# Patient Record
Sex: MALE | Race: WHITE | NOT HISPANIC OR LATINO | Employment: FULL TIME | URBAN - METROPOLITAN AREA
[De-identification: names, ages, dates, MRNs, and addresses within clinical notes are randomized per-mention and may not be internally consistent; named-entity substitution may affect disease eponyms.]

---

## 2017-05-20 ENCOUNTER — HOSPITAL ENCOUNTER (EMERGENCY)
Facility: HOSPITAL | Age: 52
Discharge: HOME | End: 2017-05-20
Attending: EMERGENCY MEDICINE | Admitting: EMERGENCY MEDICINE
Payer: COMMERCIAL

## 2017-05-20 VITALS
DIASTOLIC BLOOD PRESSURE: 70 MMHG | OXYGEN SATURATION: 97 % | TEMPERATURE: 96 F | RESPIRATION RATE: 24 BRPM | HEART RATE: 80 BPM | WEIGHT: 310 LBS | SYSTOLIC BLOOD PRESSURE: 142 MMHG

## 2017-05-20 DIAGNOSIS — T14.90XA INHALATION INJURY: Primary | ICD-10-CM

## 2017-05-20 PROCEDURE — 99283 EMERGENCY DEPT VISIT LOW MDM: CPT

## 2017-05-20 PROCEDURE — 94760 N-INVAS EAR/PLS OXIMETRY 1: CPT

## 2018-04-25 ENCOUNTER — OFFICE VISIT (OUTPATIENT)
Dept: URGENT CARE | Facility: MEDICAL CENTER | Age: 53
End: 2018-04-25
Payer: COMMERCIAL

## 2018-04-25 VITALS
SYSTOLIC BLOOD PRESSURE: 116 MMHG | RESPIRATION RATE: 20 BRPM | WEIGHT: 185 LBS | HEART RATE: 72 BPM | OXYGEN SATURATION: 97 % | TEMPERATURE: 98.4 F | DIASTOLIC BLOOD PRESSURE: 64 MMHG

## 2018-04-25 DIAGNOSIS — J30.1 SEASONAL ALLERGIC RHINITIS DUE TO POLLEN: Primary | ICD-10-CM

## 2018-04-25 DIAGNOSIS — R07.89 CHEST TIGHTNESS: ICD-10-CM

## 2018-04-25 PROCEDURE — 99213 OFFICE O/P EST LOW 20 MIN: CPT | Performed by: PHYSICIAN ASSISTANT

## 2018-04-25 PROCEDURE — 93005 ELECTROCARDIOGRAM TRACING: CPT | Performed by: PHYSICIAN ASSISTANT

## 2018-04-25 RX ORDER — MONTELUKAST SODIUM 10 MG/1
10 TABLET ORAL
Qty: 30 TABLET | Refills: 2 | Status: SHIPPED | OUTPATIENT
Start: 2018-04-25

## 2018-04-25 NOTE — PROGRESS NOTES
[facility]        NAME: Manuel Patiño is a 47 y o  male  : 1965    MRN: [id number]  DATE: 2018  TIME: 6:27 PM    Assessment and Plan   Seasonal allergic rhinitis due to pollen [J30 1]  1  Seasonal allergic rhinitis due to pollen  montelukast (SINGULAIR) 10 mg tablet   2  Chest tightness  POCT ECG         Patient Instructions     EKG looks good in office  No changes when compared to EKG in   Given duration of intermittent chest pressure and no other symptoms, will discharge to home at this time  If you chest pressure worsens or does not get better with treatment of allergies, go to the Er  If you have any shortness of breath, dizziness, nausea, vomiting, vision changes, or left shoulder pain, go to the Er  Use montelukast 10 mg daily for your allergy symptoms  Follow up with a PCP for your ongoing medical concerns  Follow up with PCP in 3-5 days  Proceed to  ER if symptoms worsen  Chief Complaint     Chief Complaint   Patient presents with    Chest Pain         History of Present Illness         This is a 69-year-old male presenting for allergy symptoms times 2-3 days  He states his symptoms consist of cough, nasal congestion  He states he has a strong history of seasonal allergies, he ran out of his Singulair over the winter and is requesting a refill  While in the office he mentions that he is also having some intermittent left-sided chest pain for the past 3 weeks  He states that at rest he does not have pain, when he takes a deep breath his pain gets worse initially and then feels much better  States that massaging the muscle over the anterior chest wall resolves his pain  The pain is not sharp or pressure in nature, he states that he cannot quite describe it  He denies any shortness of breath, radiation of the pain, dizziness, vision changes, shoulder pain, jaw pain, abdominal pain, nausea, vomiting, diarrhea, history of cardiac or pulmonary problems    He states that he has had chest pain like this in the past, when it occurs he gets an EKG to reassure himself that everything is okay  He has had stress testing in the past which was normal     EKG looked good in office, we discussed that the only way to rule out acute MI is via blood work, given duration of symptoms we had decided that emergent ER referral is not necessary given duration and intermittent nature of symptoms, the patient will go to the ER if anything worsens or changes  Review of Systems   Review of Systems   Constitutional: Negative for chills, fatigue and fever  HENT: Positive for congestion, postnasal drip, rhinorrhea and sinus pressure  Negative for ear discharge, ear pain, hearing loss and sore throat  Respiratory: Positive for cough  Negative for chest tightness and shortness of breath  Cardiovascular: Positive for chest pain  Gastrointestinal: Negative for abdominal pain, diarrhea, nausea and vomiting  Musculoskeletal: Positive for myalgias  Skin: Negative for color change  Neurological: Negative for dizziness, seizures, syncope, weakness, light-headedness, numbness and headaches  Current Medications       Current Outpatient Prescriptions:     montelukast (SINGULAIR) 10 mg tablet, Take 1 tablet (10 mg total) by mouth daily at bedtime, Disp: 30 tablet, Rfl: 2    Current Allergies     Allergies as of 04/25/2018    (No Known Allergies)            The following portions of the patient's history were reviewed and updated as appropriate: allergies, current medications, past family history, past medical history, past social history, past surgical history and problem list      Past Medical History:   Diagnosis Date    Seasonal allergies        No past surgical history on file  No family history on file  Medications have been verified          Objective   /64   Pulse 72   Temp 98 4 °F (36 9 °C) (Temporal)   Resp 20   Wt 83 9 kg (185 lb)   SpO2 97% Physical Exam     Physical Exam   Constitutional: He appears well-developed and well-nourished  No distress  HENT:   Head: Normocephalic and atraumatic  Right Ear: Tympanic membrane, external ear and ear canal normal  No drainage or tenderness  Left Ear: Tympanic membrane, external ear and ear canal normal  No drainage or tenderness  Nose: Mucosal edema and rhinorrhea present  Right sinus exhibits no maxillary sinus tenderness and no frontal sinus tenderness  Left sinus exhibits no maxillary sinus tenderness and no frontal sinus tenderness  Mouth/Throat: Uvula is midline, oropharynx is clear and moist and mucous membranes are normal  No oropharyngeal exudate, posterior oropharyngeal edema or posterior oropharyngeal erythema  Eyes: Conjunctivae are normal  Pupils are equal, round, and reactive to light  Neck: Normal range of motion  Neck supple  Cardiovascular: Normal rate, regular rhythm, S1 normal, S2 normal, normal heart sounds, intact distal pulses and normal pulses  Exam reveals no gallop and no friction rub  No murmur heard  Pulmonary/Chest: Effort normal and breath sounds normal  No respiratory distress  He has no decreased breath sounds  He has no wheezes  He has no rhonchi  He has no rales  He exhibits no tenderness  Lymphadenopathy:     He has no cervical adenopathy  Neurological: He is alert  Skin: Skin is warm and dry  He is not diaphoretic  Nursing note and vitals reviewed

## 2018-04-25 NOTE — PATIENT INSTRUCTIONS
EKG looks good in office  No changes when compared to EKG in 2012  Given duration of intermittent chest pressure and no other symptoms, will discharge to home at this time  If you chest pressure worsens or does not get better with treatment of allergies, go to the Er  If you have any shortness of breath, dizziness, nausea, vomiting, vision changes, or left shoulder pain, go to the Er  Use montelukast 10 mg daily for your allergy symptoms  Follow up with a PCP for your ongoing medical concerns  Go to the ER for any distress

## 2018-04-25 NOTE — PROGRESS NOTES
Pt with 3 week hx of chest tightness, cloudy head,dry eyes pain relieved after deep breathing  Ran out of prescription allergy medicine

## 2018-04-26 LAB
ATRIAL RATE: 63 BPM
P AXIS: 36 DEGREES
PR INTERVAL: 168 MS
QRS AXIS: 16 DEGREES
QRSD INTERVAL: 84 MS
QT INTERVAL: 406 MS
QTC INTERVAL: 415 MS
T WAVE AXIS: 24 DEGREES
VENTRICULAR RATE: 63 BPM

## 2018-04-26 PROCEDURE — 93010 ELECTROCARDIOGRAM REPORT: CPT | Performed by: INTERNAL MEDICINE

## 2018-05-11 ENCOUNTER — OFFICE VISIT (OUTPATIENT)
Dept: URGENT CARE | Facility: MEDICAL CENTER | Age: 53
End: 2018-05-11
Payer: COMMERCIAL

## 2018-05-11 VITALS
OXYGEN SATURATION: 98 % | HEIGHT: 69 IN | TEMPERATURE: 97.8 F | RESPIRATION RATE: 18 BRPM | BODY MASS INDEX: 41.47 KG/M2 | HEART RATE: 96 BPM | SYSTOLIC BLOOD PRESSURE: 140 MMHG | DIASTOLIC BLOOD PRESSURE: 80 MMHG | WEIGHT: 280 LBS

## 2018-05-11 DIAGNOSIS — J01.01 ACUTE RECURRENT MAXILLARY SINUSITIS: Primary | ICD-10-CM

## 2018-05-11 PROCEDURE — 99213 OFFICE O/P EST LOW 20 MIN: CPT | Performed by: PHYSICIAN ASSISTANT

## 2018-05-11 RX ORDER — PROMETHAZINE HYDROCHLORIDE AND CODEINE PHOSPHATE 6.25; 1 MG/5ML; MG/5ML
5 SYRUP ORAL EVERY 4 HOURS PRN
Qty: 120 ML | Refills: 0 | Status: SHIPPED | OUTPATIENT
Start: 2018-05-11 | End: 2021-07-15

## 2018-05-11 RX ORDER — AMOXICILLIN AND CLAVULANATE POTASSIUM 875; 125 MG/1; MG/1
1 TABLET, FILM COATED ORAL EVERY 12 HOURS SCHEDULED
Qty: 14 TABLET | Refills: 0 | Status: SHIPPED | OUTPATIENT
Start: 2018-05-11 | End: 2018-05-18

## 2018-05-11 RX ORDER — ALBUTEROL SULFATE 90 UG/1
2 AEROSOL, METERED RESPIRATORY (INHALATION) EVERY 6 HOURS PRN
Qty: 1 INHALER | Refills: 0 | Status: SHIPPED | OUTPATIENT
Start: 2018-05-11

## 2018-05-11 NOTE — PATIENT INSTRUCTIONS
Take Augmentin twice daily for 7 days  Take with food and eat yogurt or a probiotic to decrease GI upset  Use cough syrup as needed  Use inhalers as directed  Follow up with your PCP for continued symptoms  Go to the ER for any distress

## 2018-05-11 NOTE — PROGRESS NOTES
[facility]        NAME: Lauren Joseph is a 47 y o  male  : 1965    MRN: [id number]  DATE: May 11, 2018  TIME: 4:43 PM    Assessment and Plan   Acute recurrent maxillary sinusitis [J01 01]  1  Acute recurrent maxillary sinusitis  promethazine-codeine (PHENERGAN WITH CODEINE) 6 25-10 mg/5 mL syrup    amoxicillin-clavulanate (AUGMENTIN) 875-125 mg per tablet    albuterol (PROVENTIL HFA,VENTOLIN HFA) 90 mcg/act inhaler    fluticasone-salmeterol (ADVAIR) 250-50 mcg/dose inhaler         Patient Instructions      Take Augmentin twice daily for 7 days  Take with food and eat yogurt or a probiotic to decrease GI upset  Use cough syrup as needed  Use inhalers as directed  Follow up with PCP in 3-5 days  Proceed to  ER if symptoms worsen  Chief Complaint     Chief Complaint   Patient presents with    URI     Pt has cough, congestion, sinus pressure, ear ache x 7days  Taking Claritin         History of Present Illness       This is a 47year old male PMH seasonal allergies presenting for worsening cough and congestion x 7 days  He states that over the last 7 days his congestion has worsened and he is now having facial pain and subjective fevers, shortness of breath with cough, green mucus  He denies sore throat, ear pain, abdominal pain, nausea, vomiting, diarrhea  He has been using his allergy medication but is out of his holly and albuterol  He has a history of recurrent sinus infections  Review of Systems   Review of Systems   Constitutional: Positive for chills, fatigue and fever  HENT: Positive for congestion, postnasal drip, rhinorrhea, sinus pain and sinus pressure  Negative for ear discharge, ear pain, facial swelling and sore throat  Respiratory: Positive for cough, chest tightness and shortness of breath  Negative for wheezing  Gastrointestinal: Negative for abdominal pain, diarrhea, nausea and vomiting  Musculoskeletal: Negative for myalgias  Skin: Negative for rash  Neurological: Positive for headaches  Negative for dizziness and light-headedness  Current Medications       Current Outpatient Prescriptions:     montelukast (SINGULAIR) 10 mg tablet, Take 1 tablet (10 mg total) by mouth daily at bedtime, Disp: 30 tablet, Rfl: 2    albuterol (PROVENTIL HFA,VENTOLIN HFA) 90 mcg/act inhaler, Inhale 2 puffs every 6 (six) hours as needed for wheezing or shortness of breath, Disp: 1 Inhaler, Rfl: 0    amoxicillin-clavulanate (AUGMENTIN) 875-125 mg per tablet, Take 1 tablet by mouth every 12 (twelve) hours for 7 days, Disp: 14 tablet, Rfl: 0    fluticasone-salmeterol (ADVAIR) 250-50 mcg/dose inhaler, Inhale 1 puff every 12 (twelve) hours, Disp: 1 Inhaler, Rfl: 0    promethazine-codeine (PHENERGAN WITH CODEINE) 6 25-10 mg/5 mL syrup, Take 5 mL by mouth every 4 (four) hours as needed for cough, Disp: 120 mL, Rfl: 0    Current Allergies     Allergies as of 05/11/2018    (No Known Allergies)            The following portions of the patient's history were reviewed and updated as appropriate: allergies, current medications, past family history, past medical history, past social history, past surgical history and problem list      Past Medical History:   Diagnosis Date    Seasonal allergies        No past surgical history on file  No family history on file  Medications have been verified  Objective   /80 (BP Location: Right arm, Patient Position: Sitting)   Pulse 96   Temp 97 8 °F (36 6 °C) (Temporal)   Resp 18   Ht 5' 9" (1 753 m)   Wt 127 kg (280 lb)   SpO2 98%   BMI 41 35 kg/m²        Physical Exam     Physical Exam   Constitutional: He appears well-developed and well-nourished  No distress  HENT:   Head: Normocephalic  Right Ear: Tympanic membrane, external ear and ear canal normal  No drainage or tenderness  Left Ear: Tympanic membrane, external ear and ear canal normal  No drainage or tenderness     Nose: Mucosal edema and rhinorrhea present  Right sinus exhibits maxillary sinus tenderness  Right sinus exhibits no frontal sinus tenderness  Left sinus exhibits maxillary sinus tenderness  Left sinus exhibits no frontal sinus tenderness  Mouth/Throat: Uvula is midline and mucous membranes are normal  Posterior oropharyngeal erythema present  No oropharyngeal exudate or posterior oropharyngeal edema  Eyes: Conjunctivae are normal  Pupils are equal, round, and reactive to light  Neck: Normal range of motion  Neck supple  Cardiovascular: Normal rate, regular rhythm, normal heart sounds and intact distal pulses  Pulmonary/Chest: Effort normal and breath sounds normal  No respiratory distress  He has no decreased breath sounds  He has no wheezes  He has no rhonchi  He has no rales  Lymphadenopathy:     He has no cervical adenopathy  Neurological: He is alert  Skin: Skin is warm and dry  He is not diaphoretic  Nursing note and vitals reviewed

## 2018-08-17 ENCOUNTER — OFFICE VISIT (OUTPATIENT)
Dept: URGENT CARE | Facility: MEDICAL CENTER | Age: 53
End: 2018-08-17
Payer: COMMERCIAL

## 2018-08-17 VITALS
HEART RATE: 80 BPM | TEMPERATURE: 98.1 F | RESPIRATION RATE: 20 BRPM | BODY MASS INDEX: 44.95 KG/M2 | HEIGHT: 70 IN | DIASTOLIC BLOOD PRESSURE: 88 MMHG | OXYGEN SATURATION: 96 % | SYSTOLIC BLOOD PRESSURE: 132 MMHG | WEIGHT: 314 LBS

## 2018-08-17 DIAGNOSIS — R09.81 SINUS CONGESTION: Primary | ICD-10-CM

## 2018-08-17 PROCEDURE — 99213 OFFICE O/P EST LOW 20 MIN: CPT | Performed by: PHYSICIAN ASSISTANT

## 2018-08-17 RX ORDER — FLUTICASONE PROPIONATE 50 MCG
1 SPRAY, SUSPENSION (ML) NASAL DAILY
Qty: 1 BOTTLE | Refills: 1 | Status: SHIPPED | OUTPATIENT
Start: 2018-08-17 | End: 2021-07-15

## 2018-08-17 RX ORDER — LORATADINE 10 MG/1
10 TABLET ORAL
COMMUNITY

## 2018-08-17 NOTE — PATIENT INSTRUCTIONS
Take flonase nasal spray 1-2 sprays in each nostril daily  Add an antihistamine such as claritin or zyrtec to your regimen  Continue all other medications  Follow up with allergist   Go to the ER for any distress

## 2018-08-17 NOTE — PROGRESS NOTES
[facility]        NAME: Maykel Mojica is a 47 y o  male  : 1965    MRN: [id number]  DATE: 2018  TIME: 6:26 PM    Assessment and Plan   Sinus congestion [R09 81]  1  Sinus congestion  fluticasone (FLONASE) 50 mcg/act nasal spray    Ambulatory referral to Allergy         Patient Instructions     Take flonase nasal spray 1-2 sprays in each nostril daily  Add an antihistamine such as claritin or zyrtec to your regimen  Continue all other medications  Follow up with allergist   Follow up with PCP in 3-5 days  Proceed to  ER if symptoms worsen  Chief Complaint     Chief Complaint   Patient presents with    Nasal Congestion     Sinus congestion x 2 months  History of Present Illness       This is a 47year old male presenting for sinus congestion x 2 months  He states that he has a history of allergies and can feel them coming on  No fevers, facial pain  He used to see an allergist but has not done so in 30 years  He is looking for ways to prevent the progression of his allergy symptoms  Review of Systems   Review of Systems   Constitutional: Negative for chills, fatigue and fever  HENT: Positive for congestion  Negative for sinus pain  Respiratory: Negative for cough  Gastrointestinal: Negative for nausea and vomiting           Current Medications       Current Outpatient Prescriptions:     albuterol (PROVENTIL HFA,VENTOLIN HFA) 90 mcg/act inhaler, Inhale 2 puffs every 6 (six) hours as needed for wheezing or shortness of breath (Patient taking differently: Inhale 2 puffs as needed for wheezing or shortness of breath  ), Disp: 1 Inhaler, Rfl: 0    fluticasone-salmeterol (ADVAIR) 250-50 mcg/dose inhaler, Inhale 1 puff every 12 (twelve) hours (Patient taking differently: Inhale 1 puff as needed  ), Disp: 1 Inhaler, Rfl: 0    montelukast (SINGULAIR) 10 mg tablet, Take 1 tablet (10 mg total) by mouth daily at bedtime, Disp: 30 tablet, Rfl: 2   promethazine-codeine (PHENERGAN WITH CODEINE) 6 25-10 mg/5 mL syrup, Take 5 mL by mouth every 4 (four) hours as needed for cough (Patient taking differently: Take 5 mL by mouth as needed for cough  ), Disp: 120 mL, Rfl: 0    fluticasone (FLONASE) 50 mcg/act nasal spray, 1 spray into each nostril daily, Disp: 1 Bottle, Rfl: 1    loratadine (CLARITIN) 10 mg tablet, Take 10 mg by mouth, Disp: , Rfl:     Current Allergies     Allergies as of 08/17/2018 - Reviewed 08/17/2018   Allergen Reaction Noted    Cat hair extract Sneezing 08/17/2018            The following portions of the patient's history were reviewed and updated as appropriate: allergies, current medications, past family history, past medical history, past social history, past surgical history and problem list      Past Medical History:   Diagnosis Date    Allergic     Seasonal allergies        History reviewed  No pertinent surgical history  Family History   Problem Relation Age of Onset    No Known Problems Mother          Medications have been verified  Objective   /88 Comment: 132/88  Pulse 80   Temp 98 1 °F (36 7 °C) (Temporal)   Resp 20   Ht 5' 10" (1 778 m)   Wt (!) 142 kg (314 lb)   SpO2 96%   BMI 45 05 kg/m²        Physical Exam     Physical Exam   Constitutional: He appears well-developed and well-nourished  No distress  HENT:   Head: Normocephalic and atraumatic  Nose: Nose normal    Eyes: Conjunctivae are normal  Pupils are equal, round, and reactive to light  Cardiovascular: Normal rate, regular rhythm and normal heart sounds  Pulmonary/Chest: Effort normal and breath sounds normal  No respiratory distress  He has no wheezes  He has no rales  Neurological: He is alert  Skin: Skin is warm and dry  He is not diaphoretic  Nursing note and vitals reviewed

## 2019-09-21 ENCOUNTER — OFFICE VISIT (OUTPATIENT)
Dept: URGENT CARE | Facility: MEDICAL CENTER | Age: 54
End: 2019-09-21
Payer: COMMERCIAL

## 2019-09-21 VITALS
TEMPERATURE: 98 F | OXYGEN SATURATION: 96 % | HEART RATE: 66 BPM | WEIGHT: 275 LBS | BODY MASS INDEX: 39.37 KG/M2 | HEIGHT: 70 IN | SYSTOLIC BLOOD PRESSURE: 119 MMHG | RESPIRATION RATE: 20 BRPM | DIASTOLIC BLOOD PRESSURE: 81 MMHG

## 2019-09-21 DIAGNOSIS — M70.50 PES ANSERINE BURSITIS: Primary | ICD-10-CM

## 2019-09-21 PROCEDURE — 99213 OFFICE O/P EST LOW 20 MIN: CPT | Performed by: PHYSICIAN ASSISTANT

## 2019-09-21 RX ORDER — PREDNISONE 50 MG/1
50 TABLET ORAL DAILY
Qty: 5 TABLET | Refills: 0 | Status: SHIPPED | OUTPATIENT
Start: 2019-09-21 | End: 2019-09-26

## 2019-09-21 NOTE — PROGRESS NOTES
[facility]        NAME: Nneka Alexandra is a 49 y o  male  : 1965    MRN: [id number]  DATE: 2019  TIME: 4:50 PM    Assessment and Plan   Pes anserine bursitis [M70 50]  1  Pes anserine bursitis  predniSONE 50 mg tablet         Patient Instructions     1  Take Prednisone 50mg  Daily x 5 days  2  ICE to area 10-15min 3-4x daily  3  Recommend follow-up with Orthopedics if no improvement in 3-5 days  Chief Complaint     Chief Complaint   Patient presents with    Knee Pain     Chronic         History of Present Illness       Michael Araiza is a 69-year-old male presents with pain on the medial aspect of his left knee that occurred over the past 2 days  Patient denies any fall or trauma, he reports the pain started after he twisted his leg getting out of a crane a few days prior  Patient reports no swelling or instability, he denies any collecting or locking sensation in his left knee  Review of Systems   Review of Systems   Constitutional: Negative  Musculoskeletal: Positive for gait problem  Negative for joint swelling           Current Medications       Current Outpatient Medications:     albuterol (PROVENTIL HFA,VENTOLIN HFA) 90 mcg/act inhaler, Inhale 2 puffs every 6 (six) hours as needed for wheezing or shortness of breath (Patient taking differently: Inhale 2 puffs as needed for wheezing or shortness of breath  ), Disp: 1 Inhaler, Rfl: 0    fluticasone (FLONASE) 50 mcg/act nasal spray, 1 spray into each nostril daily, Disp: 1 Bottle, Rfl: 1    fluticasone-salmeterol (ADVAIR) 250-50 mcg/dose inhaler, Inhale 1 puff every 12 (twelve) hours (Patient taking differently: Inhale 1 puff as needed  ), Disp: 1 Inhaler, Rfl: 0    loratadine (CLARITIN) 10 mg tablet, Take 10 mg by mouth, Disp: , Rfl:     montelukast (SINGULAIR) 10 mg tablet, Take 1 tablet (10 mg total) by mouth daily at bedtime, Disp: 30 tablet, Rfl: 2    predniSONE 50 mg tablet, Take 1 tablet (50 mg total) by mouth daily for 5 days, Disp: 5 tablet, Rfl: 0    promethazine-codeine (PHENERGAN WITH CODEINE) 6 25-10 mg/5 mL syrup, Take 5 mL by mouth every 4 (four) hours as needed for cough (Patient taking differently: Take 5 mL by mouth as needed for cough  ), Disp: 120 mL, Rfl: 0    Current Allergies     Allergies as of 09/21/2019 - Reviewed 09/21/2019   Allergen Reaction Noted    Cat hair extract Sneezing 08/17/2018            The following portions of the patient's history were reviewed and updated as appropriate: allergies, current medications, past family history, past medical history, past social history, past surgical history and problem list      Past Medical History:   Diagnosis Date    Allergic     Seasonal allergies        No past surgical history on file  Family History   Problem Relation Age of Onset    No Known Problems Mother          Medications have been verified  Objective   /81 (BP Location: Right arm, Patient Position: Sitting)   Pulse 66   Temp 98 °F (36 7 °C) (Temporal)   Resp 20   Ht 5' 10" (1 778 m)   Wt 125 kg (275 lb)   SpO2 96%   BMI 39 46 kg/m²        Physical Exam     Physical Exam   Constitutional: He appears well-developed and well-nourished  No distress  Cardiovascular: Normal rate, regular rhythm and normal heart sounds  No murmur heard    Pulmonary/Chest: Effort normal and breath sounds normal    Musculoskeletal:        Legs:

## 2019-09-21 NOTE — PATIENT INSTRUCTIONS
1  Take Prednisone 50mg  Daily x 5 days  2  ICE to area 10-15min 3-4x daily  3  Recommend follow-up with Orthopedics if no improvement in 3-5 days

## 2019-10-28 ENCOUNTER — APPOINTMENT (OUTPATIENT)
Dept: RADIOLOGY | Facility: AMBULARY SURGERY CENTER | Age: 54
End: 2019-10-28
Payer: COMMERCIAL

## 2019-10-28 VITALS
HEIGHT: 70 IN | HEART RATE: 68 BPM | WEIGHT: 303 LBS | BODY MASS INDEX: 43.38 KG/M2 | SYSTOLIC BLOOD PRESSURE: 143 MMHG | DIASTOLIC BLOOD PRESSURE: 79 MMHG

## 2019-10-28 DIAGNOSIS — M25.562 LEFT KNEE PAIN, UNSPECIFIED CHRONICITY: Primary | ICD-10-CM

## 2019-10-28 DIAGNOSIS — M25.562 LEFT KNEE PAIN, UNSPECIFIED CHRONICITY: ICD-10-CM

## 2019-10-28 PROCEDURE — 73562 X-RAY EXAM OF KNEE 3: CPT

## 2019-10-28 PROCEDURE — 20610 DRAIN/INJ JOINT/BURSA W/O US: CPT | Performed by: ORTHOPAEDIC SURGERY

## 2019-10-28 PROCEDURE — 99214 OFFICE O/P EST MOD 30 MIN: CPT | Performed by: ORTHOPAEDIC SURGERY

## 2019-10-28 RX ORDER — METHYLPREDNISOLONE 4 MG/1
TABLET ORAL
Refills: 0 | COMMUNITY
Start: 2019-08-07 | End: 2021-07-15

## 2019-10-28 RX ORDER — BETAMETHASONE SODIUM PHOSPHATE AND BETAMETHASONE ACETATE 3; 3 MG/ML; MG/ML
6 INJECTION, SUSPENSION INTRA-ARTICULAR; INTRALESIONAL; INTRAMUSCULAR; SOFT TISSUE
Status: COMPLETED | OUTPATIENT
Start: 2019-10-28 | End: 2019-10-28

## 2019-10-28 RX ORDER — ESCITALOPRAM OXALATE 10 MG/1
TABLET ORAL
Refills: 0 | COMMUNITY
Start: 2019-09-21 | End: 2021-07-15

## 2019-10-28 RX ORDER — LORAZEPAM 1 MG/1
1 TABLET ORAL
COMMUNITY
Start: 2019-01-03 | End: 2021-07-15

## 2019-10-28 RX ORDER — LIDOCAINE HYDROCHLORIDE 10 MG/ML
1 INJECTION, SOLUTION INFILTRATION; PERINEURAL
Status: COMPLETED | OUTPATIENT
Start: 2019-10-28 | End: 2019-10-28

## 2019-10-28 RX ORDER — MELOXICAM 7.5 MG/1
7.5 TABLET ORAL DAILY
Qty: 30 TABLET | Refills: 1 | Status: SHIPPED | OUTPATIENT
Start: 2019-10-28 | End: 2021-07-15

## 2019-10-28 RX ADMIN — BETAMETHASONE SODIUM PHOSPHATE AND BETAMETHASONE ACETATE 6 MG: 3; 3 INJECTION, SUSPENSION INTRA-ARTICULAR; INTRALESIONAL; INTRAMUSCULAR; SOFT TISSUE at 17:32

## 2019-10-28 RX ADMIN — LIDOCAINE HYDROCHLORIDE 1 ML: 10 INJECTION, SOLUTION INFILTRATION; PERINEURAL at 17:32

## 2019-10-28 NOTE — PROGRESS NOTES
Assessment:  1  Left knee pain, unspecified chronicity  XR knee 3 vw left non injury     Patient Active Problem List   Diagnosis    Left knee pain           Plan       patient was given a cortisone injection today  Patient had prednisone before patient also wearing brace  This brace has been utilized for over a week pain is not getting any better  Regarding trying get an MRI for him my suspicion is a medial meniscus tear  He will come back to see us after the MRI is done in the meantime will prescribe him some meloxicam to also help with his pain  Subjective:     Patient ID:    Chief Complaint:Agustin Renner 49 y o  male      HPI    Patient comes in today with regards to Left knee  The patient reports that the pain is in the  Medial aspect and has been going on for  1 month  Patient is a  was getting out of a crane pivoted on his knee had pain on the medial aspect of his left knee  He went to Urgent Care had a Medrol Dosepak prescribed that helped but then shortly wore off  He continues to have pain on the medial aspect the knee  The pain is rated at8 at its best and10 at its worst  Possible previous meniscus surgery on this knee  The pain is described as  problem  It is worsened with  uncertain, and is made better with  prednisone  The patient has taken  Ice bracing for treatment        The following portions of the patient's history were reviewed and updated as appropriate: allergies, current medications, past family history, past social history, past surgical history and problem list         Social History     Socioeconomic History    Marital status: /Civil Union     Spouse name: Not on file    Number of children: Not on file    Years of education: Not on file    Highest education level: Not on file   Occupational History    Not on file   Social Needs    Financial resource strain: Not on file    Food insecurity:     Worry: Not on file     Inability: Not on file  Transportation needs:     Medical: Not on file     Non-medical: Not on file   Tobacco Use    Smoking status: Never Smoker    Smokeless tobacco: Never Used   Substance and Sexual Activity    Alcohol use: No    Drug use: No    Sexual activity: Not on file   Lifestyle    Physical activity:     Days per week: Not on file     Minutes per session: Not on file    Stress: Not on file   Relationships    Social connections:     Talks on phone: Not on file     Gets together: Not on file     Attends [employer] service: Not on file     Active member of club or organization: Not on file     Attends meetings of clubs or organizations: Not on file     Relationship status: Not on file    Intimate partner violence:     Fear of current or ex partner: Not on file     Emotionally abused: Not on file     Physically abused: Not on file     Forced sexual activity: Not on file   Other Topics Concern    Not on file   Social History Narrative    Not on file     Past Medical History:   Diagnosis Date    Allergic     Seasonal allergies      History reviewed  No pertinent surgical history    Allergies   Allergen Reactions    Cat Hair Extract Sneezing     Current Outpatient Medications on File Prior to Visit   Medication Sig Dispense Refill    LORazepam (ATIVAN) 1 mg tablet Take 1 mg by mouth      albuterol (PROVENTIL HFA,VENTOLIN HFA) 90 mcg/act inhaler Inhale 2 puffs every 6 (six) hours as needed for wheezing or shortness of breath (Patient taking differently: Inhale 2 puffs as needed for wheezing or shortness of breath  ) 1 Inhaler 0    escitalopram (LEXAPRO) 10 mg tablet   0    fluticasone (FLONASE) 50 mcg/act nasal spray 1 spray into each nostril daily 1 Bottle 1    fluticasone-salmeterol (ADVAIR) 250-50 mcg/dose inhaler Inhale 1 puff every 12 (twelve) hours (Patient taking differently: Inhale 1 puff as needed  ) 1 Inhaler 0    loratadine (CLARITIN) 10 mg tablet Take 10 mg by mouth      methylPREDNISolone 4 MG tablet therapy pack as directed Taper dose per instructions inside package  0    montelukast (SINGULAIR) 10 mg tablet Take 1 tablet (10 mg total) by mouth daily at bedtime 30 tablet 2    promethazine-codeine (PHENERGAN WITH CODEINE) 6 25-10 mg/5 mL syrup Take 5 mL by mouth every 4 (four) hours as needed for cough (Patient taking differently: Take 5 mL by mouth as needed for cough  ) 120 mL 0     No current facility-administered medications on file prior to visit  Objective:    Review of Systems   Constitutional: Negative  HENT: Negative  Eyes: Negative  Respiratory: Negative  Cardiovascular: Negative  Gastrointestinal: Negative  Negative for vomiting  Genitourinary: Negative  Musculoskeletal:        Please refer to HPI   Skin: Negative  Neurological: Negative  Hematological: Negative  Psychiatric/Behavioral: Negative  All other systems reviewed and are negative  Right Knee Exam     Muscle Strength   The patient has normal right knee strength  Range of Motion   The patient has normal right knee ROM  Left Knee Exam     Muscle Strength   The patient has normal left knee strength  Tenderness   The patient is experiencing tenderness in the medial joint line  Range of Motion   The patient has normal left knee ROM  Tests   Broderick:  Medial - negative Lateral - negative  Varus: negative Valgus: negative    Other   Erythema: absent  Sensation: normal  Pulse: present  Swelling: none  Effusion: no effusion present    Comments:   Pinpoint tenderness medially negative provocative maneuvers for patella  Broderick's test bounce test were negative  Thessaly test was positive            Physical Exam   Constitutional: He is oriented to person, place, and time  He appears well-developed  HENT:   Head: Normocephalic  Eyes: Pupils are equal, round, and reactive to light  Neck: Neck supple  Cardiovascular: Intact distal pulses     Pulmonary/Chest: Effort normal  Abdominal: He exhibits no distension  Musculoskeletal:        Left knee: He exhibits no effusion  Neurological: He is alert and oriented to person, place, and time  Skin: Skin is warm  Psychiatric: He has a normal mood and affect  Nursing note and vitals reviewed  Large joint arthrocentesis: L knee  Date/Time: 10/28/2019 5:32 PM  Consent given by: patient  Timeout: Immediately prior to procedure a time out was called to verify the correct patient, procedure, equipment, support staff and site/side marked as required   Supporting Documentation  Indications: pain   Procedure Details  Location: knee - L knee  Needle size: 22 G  Ultrasound guidance: no  Approach: anterolateral  Medications administered: 1 mL lidocaine 1 %; 6 mg betamethasone acetate-betamethasone sodium phosphate 6 (3-3) mg/mL               I have personally reviewed pertinent films in PACS and my interpretation is Medial joint space narrowing subchondral sclerosis minimal osteophyte formation  Portions of the record may have been created with voice recognition software   Occasional wrong word or "sound a like" substitutions may have occurred due to the inherent limitations of voice recognition software   Read the chart carefully and recognize, using context, where substitutions have occurred

## 2019-11-13 ENCOUNTER — TELEPHONE (OUTPATIENT)
Dept: OBGYN CLINIC | Facility: HOSPITAL | Age: 54
End: 2019-11-13

## 2019-11-13 ENCOUNTER — HOSPITAL ENCOUNTER (OUTPATIENT)
Dept: RADIOLOGY | Age: 54
Discharge: HOME | End: 2019-11-13
Attending: ORTHOPAEDIC SURGERY
Payer: COMMERCIAL

## 2019-11-13 DIAGNOSIS — M25.562 LEFT KNEE PAIN, UNSPECIFIED CHRONICITY: ICD-10-CM

## 2019-11-13 PROCEDURE — 73721 MRI JNT OF LWR EXTRE W/O DYE: CPT

## 2019-11-13 NOTE — TELEPHONE ENCOUNTER
[facility]  [phone]    Dr Smith Homes     Patient states he was told to call for f/u mri review appt after 3pm tomorrow  Dr is done at 3pm  Please return his call  Thanks

## 2019-11-14 NOTE — TELEPHONE ENCOUNTER
Called patient to schedule, he stated that he will go home and talk to his wife and call us back to set up apt to see Pierce Indore  Schedule patient at his convenience

## 2019-12-16 ENCOUNTER — TELEPHONE (OUTPATIENT)
Dept: OBGYN CLINIC | Facility: HOSPITAL | Age: 54
End: 2019-12-16

## 2019-12-16 NOTE — TELEPHONE ENCOUNTER
Spoke with pt he understands there is no available sooner appointments told pt to call again soon just in case a pt cancels

## 2019-12-19 ENCOUNTER — OFFICE VISIT (OUTPATIENT)
Dept: UROLOGY | Facility: CLINIC | Age: 54
End: 2019-12-19
Payer: COMMERCIAL

## 2019-12-19 VITALS
BODY MASS INDEX: 41.66 KG/M2 | WEIGHT: 291 LBS | DIASTOLIC BLOOD PRESSURE: 68 MMHG | SYSTOLIC BLOOD PRESSURE: 144 MMHG | HEIGHT: 70 IN | HEART RATE: 92 BPM

## 2019-12-19 DIAGNOSIS — N40.0 BENIGN PROSTATIC HYPERPLASIA WITHOUT LOWER URINARY TRACT SYMPTOMS: Primary | ICD-10-CM

## 2019-12-19 PROCEDURE — 99213 OFFICE O/P EST LOW 20 MIN: CPT | Performed by: PHYSICIAN ASSISTANT

## 2019-12-19 NOTE — PROGRESS NOTES
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  He has not had a prostate exam in the past   He gets up 2-3 times per night and drinks iced tea and coffee  Hemoglobin A1c is 5 7  Blood glucose 111  Past Medical, Past Surgical History:     Past Medical History:   Diagnosis Date    Allergic     Seasonal allergies    :    History reviewed   No pertinent surgical history :    Medications, Allergies:     Current Outpatient Medications:     albuterol (PROVENTIL HFA,VENTOLIN HFA) 90 mcg/act inhaler, Inhale 2 puffs every 6 (six) hours as needed for wheezing or shortness of breath (Patient taking differently: Inhale 2 puffs as needed for wheezing or shortness of breath  ), Disp: 1 Inhaler, Rfl: 0    escitalopram (LEXAPRO) 10 mg tablet, , Disp: , Rfl: 0    fluticasone (FLONASE) 50 mcg/act nasal spray, 1 spray into each nostril daily, Disp: 1 Bottle, Rfl: 1    fluticasone-salmeterol (ADVAIR) 250-50 mcg/dose inhaler, Inhale 1 puff every 12 (twelve) hours (Patient taking differently: Inhale 1 puff as needed  ), Disp: 1 Inhaler, Rfl: 0    loratadine (CLARITIN) 10 mg tablet, Take 10 mg by mouth, Disp: , Rfl:     LORazepam (ATIVAN) 1 mg tablet, Take 1 mg by mouth, Disp: , Rfl:     meloxicam (MOBIC) 7 5 mg tablet, Take 1 tablet (7 5 mg total) by mouth daily, Disp: 30 tablet, Rfl: 1    methylPREDNISolone 4 MG tablet therapy pack, as directed Taper dose per instructions inside package, Disp: , Rfl: 0    montelukast (SINGULAIR) 10 mg tablet, Take 1 tablet (10 mg total) by mouth daily at bedtime, Disp: 30 tablet, Rfl: 2    promethazine-codeine (PHENERGAN WITH CODEINE) 6 25-10 mg/5 mL syrup, Take 5 mL by mouth every 4 (four) hours as needed for cough (Patient taking differently: Take 5 mL by mouth as needed for cough  ), Disp: 120 mL, Rfl: 0    Allergies: Allergies   Allergen Reactions    Cat Hair Extract Sneezing   :    Social and Family History:   Social History:   Social History     Tobacco Use    Smoking status: Never Smoker    Smokeless tobacco: Never Used   Substance Use Topics    Alcohol use: No    Drug use: No        Social History     Tobacco Use   Smoking Status Never Smoker   Smokeless Tobacco Never Used       Family History:  Family History   Problem Relation Age of Onset    No Known Problems Mother    :     Review of Systems:     General: Fever, chills, or night sweats: negative  Cardiac: Negative for chest pain  Pulmonary: Negative for shortness of breath  Gastrointestinal: Abdominal pain negative  Nausea, vomiting, or diarrhea negative,  Genitourinary: See HPI above  Patient does not have hematuria  All other systems queried were negative  Physical Exam:   General: Patient is pleasant and in NAD  Awake and alert  /68 (BP Location: Left arm, Patient Position: Sitting, Cuff Size: Adult)   Pulse 92   Ht 5' 10" (1 778 m)   Wt 132 kg (291 lb)   BMI 41 75 kg/m²   Cardiac: Peripheral edema: negative  Pulmonary: Non-labored breathing  Abdomen: Soft, non-tender, non-distended  No surgical scars  No masses, tenderness, hernias noted  Genitourinary: Negative CVA tenderness, negative suprapubic tenderness  (Male): Penis uncircumcised, phallus normal, meatus patent  Testicles descended into scrotum bilaterally without masses nor tenderness  No inguinal hernias bilaterally  CHAS: Prostate is enlarged at 35 grams  The prostate is not boggy  The prostate is not tender  No nodules noted      Labs:   No results found for: HGB, HCT, WBC, PLT]    No results found for: NA, K, CL, CO2, BUN, CREATININE, CALCIUM, GLUCOSE]    Imaging:   I personally reviewed the images and report of the following studies, and reviewed them with the patient:   none      ASSESSMENT:      1  BPH   2  Routine prostate cancer screening    PLAN:   - he will get a PSA next week and in 1 year prior to his annual visit      Thank you for allowing me to participate in this patients care  Please do not hesitate to call with any additional questions    Indira Borges PA-C

## 2019-12-24 ENCOUNTER — TELEPHONE (OUTPATIENT)
Dept: OBGYN CLINIC | Facility: HOSPITAL | Age: 54
End: 2019-12-24

## 2019-12-24 NOTE — TELEPHONE ENCOUNTER
Patient sees Dr Nicanor Lang patient was calling in wanting to confirm his appointment in which it had already passed  He wanted the Dr to be aware he never received a text or phone call confirming his appointment for today   He did reschedule it for 1/2/2020

## 2020-01-02 ENCOUNTER — OFFICE VISIT (OUTPATIENT)
Dept: OBGYN CLINIC | Facility: CLINIC | Age: 55
End: 2020-01-02
Payer: COMMERCIAL

## 2020-01-02 VITALS — HEART RATE: 78 BPM | SYSTOLIC BLOOD PRESSURE: 139 MMHG | DIASTOLIC BLOOD PRESSURE: 82 MMHG

## 2020-01-02 DIAGNOSIS — S83.242D OTHER TEAR OF MEDIAL MENISCUS OF LEFT KNEE AS CURRENT INJURY, SUBSEQUENT ENCOUNTER: Primary | ICD-10-CM

## 2020-01-02 PROBLEM — S83.242A TEAR OF MEDIAL MENISCUS OF LEFT KNEE, CURRENT: Status: ACTIVE | Noted: 2020-01-02

## 2020-01-02 PROCEDURE — 99212 OFFICE O/P EST SF 10 MIN: CPT | Performed by: ORTHOPAEDIC SURGERY

## 2020-01-02 NOTE — H&P (VIEW-ONLY)
Assessment:  1  Other tear of medial meniscus of left knee as current injury, subsequent encounter       Patient Active Problem List   Diagnosis    Left knee pain           Plan      Patient would like to proceed with surgical intervention patient is been properly consented  Subjective:     Patient ID:    Chief Complaint:Agustin Renner 49 y o  male      HPI    Patient comes in today with regards to left knee  The patient reports that the pain medial side of left knee has an MRI came in today to review the MRI MRI is positive for medial meniscus tear  Pain is better after cortisone injection but 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  He is concerned for reaggravation which is definitely a possibility  We went over the pros and cons of surgical intervention including and not limited to arthritis recurrence incomplete relief of pain  Patient would like to proceed with surgical intervention        The following portions of the patient's history were reviewed and updated as appropriate: allergies, current medications, past family history, past social history, past surgical history and problem list         Social History     Socioeconomic History    Marital status: /Civil Union     Spouse name: Not on file    Number of children: Not on file    Years of education: Not on file    Highest education level: Not on file   Occupational History    Not on file   Social Needs    Financial resource strain: Not on file    Food insecurity:     Worry: Not on file     Inability: Not on file    Transportation needs:     Medical: Not on file     Non-medical: Not on file   Tobacco Use    Smoking status: Never Smoker    Smokeless tobacco: Never Used   Substance and Sexual Activity    Alcohol use: No    Drug use: No    Sexual activity: Not on file   Lifestyle    Physical activity:     Days per week: Not on file     Minutes per session: Not on file    Stress: Not on file Relationships    Social connections:     Talks on phone: Not on file     Gets together: Not on file     Attends [employer] service: Not on file     Active member of club or organization: Not on file     Attends meetings of clubs or organizations: Not on file     Relationship status: Not on file    Intimate partner violence:     Fear of current or ex partner: Not on file     Emotionally abused: Not on file     Physically abused: Not on file     Forced sexual activity: Not on file   Other Topics Concern    Not on file   Social History Narrative    Not on file     Past Medical History:   Diagnosis Date    Allergic     Seasonal allergies      No past surgical history on file    Allergies   Allergen Reactions    Cat Hair Extract Sneezing     Current Outpatient Medications on File Prior to Visit   Medication Sig Dispense Refill    albuterol (PROVENTIL HFA,VENTOLIN HFA) 90 mcg/act inhaler Inhale 2 puffs every 6 (six) hours as needed for wheezing or shortness of breath (Patient taking differently: Inhale 2 puffs as needed for wheezing or shortness of breath  ) 1 Inhaler 0    escitalopram (LEXAPRO) 10 mg tablet   0    fluticasone (FLONASE) 50 mcg/act nasal spray 1 spray into each nostril daily 1 Bottle 1    fluticasone-salmeterol (ADVAIR) 250-50 mcg/dose inhaler Inhale 1 puff every 12 (twelve) hours (Patient taking differently: Inhale 1 puff as needed  ) 1 Inhaler 0    loratadine (CLARITIN) 10 mg tablet Take 10 mg by mouth      LORazepam (ATIVAN) 1 mg tablet Take 1 mg by mouth      meloxicam (MOBIC) 7 5 mg tablet Take 1 tablet (7 5 mg total) by mouth daily 30 tablet 1    methylPREDNISolone 4 MG tablet therapy pack as directed Taper dose per instructions inside package  0    montelukast (SINGULAIR) 10 mg tablet Take 1 tablet (10 mg total) by mouth daily at bedtime 30 tablet 2    promethazine-codeine (PHENERGAN WITH CODEINE) 6 25-10 mg/5 mL syrup Take 5 mL by mouth every 4 (four) hours as needed for cough (Patient taking differently: Take 5 mL by mouth as needed for cough  ) 120 mL 0     No current facility-administered medications on file prior to visit  Objective:    Review of Systems   Constitutional: Negative  HENT: Negative  Eyes: Negative  Respiratory: Negative  Cardiovascular: Negative  Gastrointestinal: Negative  Negative for vomiting  Genitourinary: Negative  Musculoskeletal:        Please refer to HPI   Skin: Negative  Neurological: Negative  Hematological: Negative  Psychiatric/Behavioral: Negative  All other systems reviewed and are negative  Right Knee Exam     Muscle Strength   The patient has normal right knee strength  Left Knee Exam     Muscle Strength   The patient has normal left knee strength  Tenderness   The patient is experiencing tenderness in the medial joint line  Physical Exam  Pinpoint tenderness on the medial joint line Broderick's test about test were negative but the sleep test was positive on previous exam   Procedures  No Procedures performed today    I have personally reviewed pertinent films in PACS and my interpretation is Reviewed the MRI which shows a complex medial meniscus tear which shows what looks to be a flipped fragment into the medial gutter  Portions of the record may have been created with voice recognition software   Occasional wrong word or "sound a like" substitutions may have occurred due to the inherent limitations of voice recognition software   Read the chart carefully and recognize, using context, where substitutions have occurred

## 2020-01-03 ENCOUNTER — TELEPHONE (OUTPATIENT)
Dept: OBGYN CLINIC | Facility: HOSPITAL | Age: 55
End: 2020-01-03

## 2020-01-03 RX ORDER — MULTIVITAMIN
1 TABLET ORAL DAILY
COMMUNITY

## 2020-01-03 RX ORDER — RIBOFLAVIN (VITAMIN B2) 100 MG
100 TABLET ORAL DAILY
COMMUNITY

## 2020-01-03 NOTE — TELEPHONE ENCOUNTER
Patient is calling wondering if there were any openings for surgery on 1/7/19 to move the surgery up a week

## 2020-01-06 ENCOUNTER — TELEPHONE (OUTPATIENT)
Dept: UROLOGY | Facility: AMBULATORY SURGERY CENTER | Age: 55
End: 2020-01-06

## 2020-01-06 ENCOUNTER — TELEPHONE (OUTPATIENT)
Dept: OBGYN CLINIC | Facility: HOSPITAL | Age: 55
End: 2020-01-06

## 2020-01-06 NOTE — TELEPHONE ENCOUNTER
If he could please let the patient know his PSA was 1 5 and normal   He may follow up in 1 year with a PSA prior to visit

## 2020-01-06 NOTE — TELEPHONE ENCOUNTER
Called patient to let him know I finished his FMLA paperwork   He will call me back with the information of where to send paperwork to

## 2020-01-06 NOTE — TELEPHONE ENCOUNTER
I called to let patient know that there are no openings to move his surgery up, it will remain on 1/14/20

## 2020-01-06 NOTE — TELEPHONE ENCOUNTER
Left message for patient, and notified him of his normal PSA  Also advised patient to keep his 1 year F/U appointment with a PSA blood test prior to his visit as per Loly OSHEA  He was told to give us a call with any further problems or questions should any come up prior to his next visit

## 2020-01-09 LAB
ALBUMIN SERPL-MCNC: 4.1 G/DL (ref 3.6–5.1)
ALBUMIN/GLOB SERPL: 1.8 (CALC) (ref 1–2.5)
ALP SERPL-CCNC: 54 U/L (ref 40–115)
ALT SERPL-CCNC: 14 U/L (ref 9–46)
AST SERPL-CCNC: 13 U/L (ref 10–35)
BILIRUB SERPL-MCNC: 0.5 MG/DL (ref 0.2–1.2)
BUN SERPL-MCNC: 16 MG/DL (ref 7–25)
BUN/CREAT SERPL: ABNORMAL (CALC) (ref 6–22)
CALCIUM SERPL-MCNC: 9.1 MG/DL (ref 8.6–10.3)
CHLORIDE SERPL-SCNC: 104 MMOL/L (ref 98–110)
CO2 SERPL-SCNC: 26 MMOL/L (ref 20–32)
CREAT SERPL-MCNC: 0.83 MG/DL (ref 0.7–1.33)
GLOBULIN SER CALC-MCNC: 2.3 G/DL (CALC) (ref 1.9–3.7)
GLUCOSE SERPL-MCNC: 103 MG/DL (ref 65–99)
POTASSIUM SERPL-SCNC: 4.6 MMOL/L (ref 3.5–5.3)
PROT SERPL-MCNC: 6.4 G/DL (ref 6.1–8.1)
SL AMB EGFR AFRICAN AMERICAN: 116 ML/MIN/1.73M2
SL AMB EGFR NON AFRICAN AMERICAN: 100 ML/MIN/1.73M2
SODIUM SERPL-SCNC: 141 MMOL/L (ref 135–146)

## 2020-01-10 DIAGNOSIS — Z91.89 AT RISK FOR OBSTRUCTIVE SLEEP APNEA: Primary | ICD-10-CM

## 2020-01-13 ENCOUNTER — ANESTHESIA EVENT (OUTPATIENT)
Dept: PERIOP | Facility: HOSPITAL | Age: 55
End: 2020-01-13
Payer: COMMERCIAL

## 2020-01-14 ENCOUNTER — HOSPITAL ENCOUNTER (OUTPATIENT)
Facility: HOSPITAL | Age: 55
Setting detail: OUTPATIENT SURGERY
Discharge: HOME | End: 2020-01-14
Attending: ORTHOPAEDIC SURGERY | Admitting: ORTHOPAEDIC SURGERY
Payer: COMMERCIAL

## 2020-01-14 ENCOUNTER — ANESTHESIA (OUTPATIENT)
Dept: PERIOP | Facility: HOSPITAL | Age: 55
End: 2020-01-14
Payer: COMMERCIAL

## 2020-01-14 VITALS
TEMPERATURE: 97.2 F | BODY MASS INDEX: 41.66 KG/M2 | SYSTOLIC BLOOD PRESSURE: 129 MMHG | HEART RATE: 79 BPM | RESPIRATION RATE: 18 BRPM | OXYGEN SATURATION: 97 % | WEIGHT: 291 LBS | DIASTOLIC BLOOD PRESSURE: 71 MMHG | HEIGHT: 70 IN

## 2020-01-14 DIAGNOSIS — S83.242A TEAR OF MEDIAL MENISCUS OF LEFT KNEE, CURRENT, UNSPECIFIED TEAR TYPE, INITIAL ENCOUNTER: Primary | ICD-10-CM

## 2020-01-14 PROCEDURE — 29880 ARTHRS KNE SRG MNISECTMY M&L: CPT | Performed by: ORTHOPAEDIC SURGERY

## 2020-01-14 RX ORDER — GLYCOPYRROLATE 0.2 MG/ML
INJECTION INTRAMUSCULAR; INTRAVENOUS AS NEEDED
Status: DISCONTINUED | OUTPATIENT
Start: 2020-01-14 | End: 2020-01-14 | Stop reason: SURG

## 2020-01-14 RX ORDER — HYDROMORPHONE HCL 110MG/55ML
PATIENT CONTROLLED ANALGESIA SYRINGE INTRAVENOUS AS NEEDED
Status: DISCONTINUED | OUTPATIENT
Start: 2020-01-14 | End: 2020-01-14 | Stop reason: SURG

## 2020-01-14 RX ORDER — OXYCODONE HYDROCHLORIDE 5 MG/1
10 TABLET ORAL EVERY 4 HOURS PRN
Status: DISCONTINUED | OUTPATIENT
Start: 2020-01-14 | End: 2020-01-14 | Stop reason: HOSPADM

## 2020-01-14 RX ORDER — ONDANSETRON 2 MG/ML
4 INJECTION INTRAMUSCULAR; INTRAVENOUS ONCE AS NEEDED
Status: DISCONTINUED | OUTPATIENT
Start: 2020-01-14 | End: 2020-01-14 | Stop reason: HOSPADM

## 2020-01-14 RX ORDER — LIDOCAINE HYDROCHLORIDE 10 MG/ML
INJECTION, SOLUTION EPIDURAL; INFILTRATION; INTRACAUDAL; PERINEURAL AS NEEDED
Status: DISCONTINUED | OUTPATIENT
Start: 2020-01-14 | End: 2020-01-14 | Stop reason: SURG

## 2020-01-14 RX ORDER — SODIUM CHLORIDE, SODIUM LACTATE, POTASSIUM CHLORIDE, CALCIUM CHLORIDE 600; 310; 30; 20 MG/100ML; MG/100ML; MG/100ML; MG/100ML
125 INJECTION, SOLUTION INTRAVENOUS CONTINUOUS
Status: DISCONTINUED | OUTPATIENT
Start: 2020-01-14 | End: 2020-01-14 | Stop reason: HOSPADM

## 2020-01-14 RX ORDER — PROPOFOL 10 MG/ML
INJECTION, EMULSION INTRAVENOUS AS NEEDED
Status: DISCONTINUED | OUTPATIENT
Start: 2020-01-14 | End: 2020-01-14 | Stop reason: SURG

## 2020-01-14 RX ORDER — MORPHINE SULFATE 4 MG/ML
2 INJECTION, SOLUTION INTRAMUSCULAR; INTRAVENOUS EVERY 4 HOURS PRN
Status: DISCONTINUED | OUTPATIENT
Start: 2020-01-14 | End: 2020-01-14 | Stop reason: HOSPADM

## 2020-01-14 RX ORDER — OXYCODONE HYDROCHLORIDE 5 MG/1
5 TABLET ORAL EVERY 4 HOURS PRN
Status: DISCONTINUED | OUTPATIENT
Start: 2020-01-14 | End: 2020-01-14 | Stop reason: HOSPADM

## 2020-01-14 RX ORDER — CEFAZOLIN SODIUM 2 G/50ML
2000 SOLUTION INTRAVENOUS ONCE
Status: COMPLETED | OUTPATIENT
Start: 2020-01-14 | End: 2020-01-14

## 2020-01-14 RX ORDER — FENTANYL CITRATE 50 UG/ML
INJECTION, SOLUTION INTRAMUSCULAR; INTRAVENOUS AS NEEDED
Status: DISCONTINUED | OUTPATIENT
Start: 2020-01-14 | End: 2020-01-14 | Stop reason: SURG

## 2020-01-14 RX ORDER — LIDOCAINE HYDROCHLORIDE AND EPINEPHRINE 10; 10 MG/ML; UG/ML
INJECTION, SOLUTION INFILTRATION; PERINEURAL AS NEEDED
Status: DISCONTINUED | OUTPATIENT
Start: 2020-01-14 | End: 2020-01-14 | Stop reason: HOSPADM

## 2020-01-14 RX ORDER — KETOROLAC TROMETHAMINE 30 MG/ML
30 INJECTION, SOLUTION INTRAMUSCULAR; INTRAVENOUS ONCE
Status: COMPLETED | OUTPATIENT
Start: 2020-01-14 | End: 2020-01-14

## 2020-01-14 RX ORDER — LIDOCAINE HYDROCHLORIDE 10 MG/ML
0.5 INJECTION, SOLUTION EPIDURAL; INFILTRATION; INTRACAUDAL; PERINEURAL ONCE AS NEEDED
Status: DISCONTINUED | OUTPATIENT
Start: 2020-01-14 | End: 2020-01-14 | Stop reason: HOSPADM

## 2020-01-14 RX ORDER — OXYCODONE HYDROCHLORIDE AND ACETAMINOPHEN 5; 325 MG/1; MG/1
1 TABLET ORAL EVERY 6 HOURS PRN
Qty: 7 TABLET | Refills: 0 | Status: SHIPPED | OUTPATIENT
Start: 2020-01-14 | End: 2020-01-24

## 2020-01-14 RX ORDER — ONDANSETRON 2 MG/ML
4 INJECTION INTRAMUSCULAR; INTRAVENOUS EVERY 6 HOURS PRN
Status: DISCONTINUED | OUTPATIENT
Start: 2020-01-14 | End: 2020-01-14 | Stop reason: HOSPADM

## 2020-01-14 RX ORDER — MIDAZOLAM HYDROCHLORIDE 2 MG/2ML
INJECTION, SOLUTION INTRAMUSCULAR; INTRAVENOUS AS NEEDED
Status: DISCONTINUED | OUTPATIENT
Start: 2020-01-14 | End: 2020-01-14 | Stop reason: SURG

## 2020-01-14 RX ORDER — HYDROMORPHONE HCL/PF 1 MG/ML
0.5 SYRINGE (ML) INJECTION
Status: DISCONTINUED | OUTPATIENT
Start: 2020-01-14 | End: 2020-01-14 | Stop reason: HOSPADM

## 2020-01-14 RX ORDER — METOCLOPRAMIDE HYDROCHLORIDE 5 MG/ML
5 INJECTION INTRAMUSCULAR; INTRAVENOUS ONCE AS NEEDED
Status: DISCONTINUED | OUTPATIENT
Start: 2020-01-14 | End: 2020-01-14 | Stop reason: HOSPADM

## 2020-01-14 RX ORDER — FENTANYL CITRATE/PF 50 MCG/ML
25 SYRINGE (ML) INJECTION
Status: DISCONTINUED | OUTPATIENT
Start: 2020-01-14 | End: 2020-01-14 | Stop reason: HOSPADM

## 2020-01-14 RX ORDER — ONDANSETRON 2 MG/ML
INJECTION INTRAMUSCULAR; INTRAVENOUS AS NEEDED
Status: DISCONTINUED | OUTPATIENT
Start: 2020-01-14 | End: 2020-01-14 | Stop reason: SURG

## 2020-01-14 RX ORDER — DEXAMETHASONE SODIUM PHOSPHATE 10 MG/ML
INJECTION, SOLUTION INTRAMUSCULAR; INTRAVENOUS AS NEEDED
Status: DISCONTINUED | OUTPATIENT
Start: 2020-01-14 | End: 2020-01-14 | Stop reason: SURG

## 2020-01-14 RX ADMIN — PROPOFOL 100 MG: 10 INJECTION, EMULSION INTRAVENOUS at 11:07

## 2020-01-14 RX ADMIN — FENTANYL CITRATE 50 MCG: 50 INJECTION INTRAMUSCULAR; INTRAVENOUS at 11:26

## 2020-01-14 RX ADMIN — SODIUM CHLORIDE, SODIUM LACTATE, POTASSIUM CHLORIDE, AND CALCIUM CHLORIDE: .6; .31; .03; .02 INJECTION, SOLUTION INTRAVENOUS at 11:02

## 2020-01-14 RX ADMIN — ONDANSETRON 4 MG: 2 INJECTION INTRAMUSCULAR; INTRAVENOUS at 11:43

## 2020-01-14 RX ADMIN — DEXAMETHASONE SODIUM PHOSPHATE 8 MG: 10 INJECTION, SOLUTION INTRAMUSCULAR; INTRAVENOUS at 11:22

## 2020-01-14 RX ADMIN — FENTANYL CITRATE 50 MCG: 50 INJECTION INTRAMUSCULAR; INTRAVENOUS at 11:11

## 2020-01-14 RX ADMIN — KETOROLAC TROMETHAMINE 30 MG: 30 INJECTION, SOLUTION INTRAMUSCULAR at 13:16

## 2020-01-14 RX ADMIN — PROPOFOL 50 MG: 10 INJECTION, EMULSION INTRAVENOUS at 11:06

## 2020-01-14 RX ADMIN — PROPOFOL 250 MG: 10 INJECTION, EMULSION INTRAVENOUS at 11:05

## 2020-01-14 RX ADMIN — LIDOCAINE HYDROCHLORIDE 50 MG: 10 INJECTION, SOLUTION EPIDURAL; INFILTRATION; INTRACAUDAL; PERINEURAL at 11:05

## 2020-01-14 RX ADMIN — FENTANYL CITRATE 50 MCG: 50 INJECTION INTRAMUSCULAR; INTRAVENOUS at 11:15

## 2020-01-14 RX ADMIN — FENTANYL CITRATE 25 MCG: 50 INJECTION, SOLUTION INTRAMUSCULAR; INTRAVENOUS at 12:25

## 2020-01-14 RX ADMIN — HYDROMORPHONE HYDROCHLORIDE 0.5 MG: 2 INJECTION INTRAMUSCULAR; INTRAVENOUS; SUBCUTANEOUS at 11:20

## 2020-01-14 RX ADMIN — MIDAZOLAM HYDROCHLORIDE 2 MG: 1 INJECTION, SOLUTION INTRAMUSCULAR; INTRAVENOUS at 11:00

## 2020-01-14 RX ADMIN — GLYCOPYRROLATE 0.2 MG: 0.2 INJECTION, SOLUTION INTRAMUSCULAR; INTRAVENOUS at 11:09

## 2020-01-14 RX ADMIN — FENTANYL CITRATE 25 MCG: 50 INJECTION, SOLUTION INTRAMUSCULAR; INTRAVENOUS at 12:41

## 2020-01-14 RX ADMIN — FENTANYL CITRATE 50 MCG: 50 INJECTION INTRAMUSCULAR; INTRAVENOUS at 11:35

## 2020-01-14 RX ADMIN — HYDROMORPHONE HYDROCHLORIDE 0.5 MG: 1 INJECTION, SOLUTION INTRAMUSCULAR; INTRAVENOUS; SUBCUTANEOUS at 13:09

## 2020-01-14 RX ADMIN — CEFAZOLIN SODIUM 2000 MG: 2 SOLUTION INTRAVENOUS at 10:55

## 2020-01-14 RX ADMIN — HYDROMORPHONE HYDROCHLORIDE 0.5 MG: 1 INJECTION, SOLUTION INTRAMUSCULAR; INTRAVENOUS; SUBCUTANEOUS at 12:56

## 2020-01-14 NOTE — ANESTHESIA PREPROCEDURE EVALUATION
Review of Systems/Medical History  Patient summary reviewed  Chart reviewed  No history of anesthetic complications     Cardiovascular  Negative cardio ROS Exercise tolerance (METS): >4,     Pulmonary    Comment: Allergies - on multiple meds     GI/Hepatic    GERD (moderately controlled - no reflux currently, appropriately NPO) ,        Negative  ROS        Endo/Other    Obesity (BMI 41)  morbid obesity   GYN       Hematology  Negative hematology ROS      Musculoskeletal  Negative musculoskeletal ROS        Neurology  Negative neurology ROS      Psychology   Anxiety,            Physical Exam    Airway    Mallampati score: II  TM Distance: >3 FB  Neck ROM: full     Dental   Comment: Braces -  Nothing removeable,     Cardiovascular  Comment: Negative ROS,     Pulmonary      Other Findings  Extremely anxious    Lab Results   Component Value Date    SODIUM 141 01/08/2020    K 4 6 01/08/2020    BUN 16 01/08/2020    CREATININE 0 83 01/08/2020     Anesthesia Plan  ASA Score- 3     Anesthesia Type- general with ASA Monitors  Additional Monitors:   Airway Plan: LMA  Plan Factors-    Induction- intravenous  Postoperative Plan-     Informed Consent- Anesthetic plan and risks discussed with patient and spouse  I personally reviewed this patient with the CRNA  Discussed and agreed on the Anesthesia Plan with the CRNA  Yg Shelton

## 2020-01-14 NOTE — ANESTHESIA POSTPROCEDURE EVALUATION
Post-Op Assessment Note    CV Status:  Stable  Pain Score: 0    Pain management: adequate     Mental Status:  Sleepy   Hydration Status:  Euvolemic   PONV Controlled:  Controlled   Airway Patency:  Patent   Post Op Vitals Reviewed: Yes      Staff: CRNA   Comments: vss sv nonobstructed uneventful          BP   115/62   Temp   98 7   Pulse 74   Resp 24   SpO2 97

## 2020-01-14 NOTE — OP NOTE
OPERATIVE REPORT  PATIENT NAME: Aj Renner    :  1965  MRN: [id number]  Pt Location: AN OR ROOM 01    SURGERY DATE: 2020    Surgeon(s) and Role:     DO Gita Chavez Primary    Preop Diagnosis:  Other tear of medial meniscus of left knee as current injury, subsequent encounter [G95 242D]    Post-Op Diagnosis Codes:     * Other tear of medial meniscus of left knee as current injury, subsequent encounter [S64 242D]    Procedure(s) (LRB):  KNEE ARTHROSCOPIC MENISCECTOMY LATERAL /MEDIAL (Left)    Specimen(s):  * No specimens in log *    Estimated Blood Loss:   Minimal    Drains:  * No LDAs found *    Anesthesia Type:   General    Operative Indications: Other tear of medial meniscus of left knee as current injury, subsequent encounter [R32 242D]      Operative Findings:  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, stage I and 2 medial femoral condyle and tibial plateau change radial tear medial meniscus lateral meniscus minimal fraying no articular damage laterally  Plica medially extensive synovial tissue reaction as well patellofemoral    Complications:   None    Procedure and Technique:  Patient was seen and examined in the preoperative area  The left lower extremity was marked, the consent an H&P had been reviewed  The patient was brought back to the operative suite  The patient was intubated sedated  The left lower extremity was prepped and draped in a sterile fashion  After proper timeout commenced and identified the left lower extremity as the operative site  Injection of quarter percent Marcaine with epinephrine was injected in both medial lateral portal site  Incision over the lateral portal was made with eleven blade  We performed a diagnostic arthroscopy  We used the arthroscope and spinal needle to located our medial portal, and made incision with 11 blade and dilated with hemostat  We completed our diagnostic arthroscopy    We found 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, stage I and 2 medial femoral condyle and tibial plateau change radial tear medial meniscus lateral meniscus minimal fraying no articular damage laterally  Plica medially extensive synovial tissue reaction as well patellofemoral  We debrided our meniscus back to a stable by using electro cautery device, shaver, and biters  We performed a plica resection and small amount of synovial resection as well We copiously irrigated the wound  We closed the incision with 3-0 nylon  Xeroform was applied to the incisions  4 x 4's ABDs , web roll and an Ace wrap were applied to left lower extremity  The patient was taken back to PACU after anesthesia was reversed        I was present for the entire procedure and A qualified resident physician was not available    Patient Disposition:  PACU     SIGNATURE: Odin Mitchell DO  DATE: January 14, 2020  TIME: 10:51 AM

## 2020-01-21 RX ORDER — HYDROXYZINE HYDROCHLORIDE 25 MG/1
25 TABLET, FILM COATED ORAL EVERY 6 HOURS PRN
COMMUNITY
Start: 2020-01-14 | End: 2021-07-15

## 2020-01-23 ENCOUNTER — OFFICE VISIT (OUTPATIENT)
Dept: OBGYN CLINIC | Facility: CLINIC | Age: 55
End: 2020-01-23

## 2020-01-23 VITALS — BODY MASS INDEX: 41.66 KG/M2 | HEIGHT: 70 IN | WEIGHT: 291 LBS

## 2020-01-23 DIAGNOSIS — S83.242D TEAR OF MEDIAL MENISCUS OF LEFT KNEE, CURRENT, UNSPECIFIED TEAR TYPE, SUBSEQUENT ENCOUNTER: Primary | ICD-10-CM

## 2020-01-23 DIAGNOSIS — Z98.890 STATUS POST ARTHROSCOPIC PARTIAL MEDIAL MENISCECTOMY: ICD-10-CM

## 2020-01-23 DIAGNOSIS — M17.12 PRIMARY OSTEOARTHRITIS OF LEFT KNEE: ICD-10-CM

## 2020-01-23 PROCEDURE — 99024 POSTOP FOLLOW-UP VISIT: CPT | Performed by: ORTHOPAEDIC SURGERY

## 2020-01-23 NOTE — LETTER
January 23, 2020     Patient: Froylan Yi   YOB: 1965   Date of Visit: 1/23/2020       To Whom it May Concern:    Froylan Yi is under my professional care  He was seen in my office on 1/23/2020  He  Is to remain out of work until his next follow-up visit in approximately 2 weeks from today's date  If you have any questions or concerns, please don't hesitate to call           Sincerely,          Destin Harley DO        CC: No Recipients

## 2020-01-23 NOTE — PROGRESS NOTES
Assessment:     Status post left knee  Arthroscopic partial medial meniscectomy, partial synovectomy on 01/14/2020    Plan:    weight-bearing as tolerated  Recommend caution with  Prolonged weight-bearing and high impact activities for now   analgesics as needed   patient  Instructed in  Quad sets and straight leg raises   1 more follow-up for recheck and beginning eccentric strengthening   patient will be out of work for least 2 more weeks  A note will be provided   prior authorization for Visco injections will be ordered    Follow Up:  2  week(s)        CHIEF COMPLAINT:  Chief Complaint   Patient presents with    Left Knee - Post-op         SUBJECTIVE:  Jaxon Jauregui is a 49y o  year old male who presents for follow up after  Left knee arthroscopic meniscectomy  Partial synovectomy  Today patient has Pain  Mild  Intermittant  Aching  And sore  Denies any numbness or tingling  No fevers or chills        PHYSICAL EXAMINATION:  General: well developed and well nourished, alert, oriented times 3 and appears comfortable  Psychiatric: Normal    MUSCULOSKELETAL EXAMINATION:  Incision:   Incisions are clean dry and intact healing well no erythema no ecchymosis no effusion no swelling  Range of Motion: As expected and  full range of motion of the knee  Neurovascular status: Neuro intact, good cap refill  Activity Restrictions: No restrictions  Done today: Sutures out and Steri strips applied      STUDIES REVIEWED:  No Studies to review      PROCEDURES PERFORMED:  Procedures  No Procedures performed today

## 2020-01-31 ENCOUNTER — TELEPHONE (OUTPATIENT)
Dept: OBGYN CLINIC | Facility: HOSPITAL | Age: 55
End: 2020-01-31

## 2020-01-31 DIAGNOSIS — Z98.890 STATUS POST ARTHROSCOPIC PARTIAL MEDIAL MENISCECTOMY: Primary | ICD-10-CM

## 2020-01-31 NOTE — TELEPHONE ENCOUNTER
Merklinger, [place] [phone]    Patient would like call back to discuss PO pain level 7 behind knee   Wants to know if it is normal  F/u 2/7

## 2020-01-31 NOTE — TELEPHONE ENCOUNTER
Patient returned Laron's call and asked to speak with him    Call was transferred to [facility]

## 2020-01-31 NOTE — TELEPHONE ENCOUNTER
Spoke to patient  He stated that he is still having pain in the back of the knee like her did prior to surgery  Denies redness, heat to touch, and swelling  Denies calf pain, redness, or swelling  Stated he feels instable when turning the knee as well  Denies doing any strenuous exercise or reinjuring the area  Denies taking anything for the pain at this point but stated he is icing  Please advise

## 2020-01-31 NOTE — TELEPHONE ENCOUNTER
Called and left a message for the patient  If he calls back, let him know that would recommend he  Continues with activity modification for now icing anti-inflammatories would be recommended  Let him know that we will be referring him to physical therapy at this point    Based on the information provided do not have any concern for infection and very low suspicion for repeat repeat meniscal injury this could be still some postsurgical pain

## 2020-02-04 ENCOUNTER — TELEPHONE (OUTPATIENT)
Dept: OBGYN CLINIC | Facility: HOSPITAL | Age: 55
End: 2020-02-04

## 2020-02-04 NOTE — TELEPHONE ENCOUNTER
Dr Patrick   #: [phone]         Patient called in checking a status on gel injections?  Please advise, thank you

## 2020-02-05 NOTE — TELEPHONE ENCOUNTER
Patient is calling back to check on the status of getting the gel injections  I got Emperatriz Galeano who will call [facility] right now to check on the status and follow up with patient after      Callback VF#[phone]

## 2020-02-06 NOTE — TELEPHONE ENCOUNTER
Called [facility] yesterday to check status of injections - Roldan Jama informed me that the insurance was never given to them which I then explained to her that Pawel Love indeed faxed over the form which had the insurance information on it and she sent over a copy of the insurance cards  So I had to give them the information all over AGAIN over the phone this way they can work on this STAT

## 2020-02-07 ENCOUNTER — OFFICE VISIT (OUTPATIENT)
Dept: OBGYN CLINIC | Facility: CLINIC | Age: 55
End: 2020-02-07

## 2020-02-07 VITALS — HEIGHT: 70 IN | BODY MASS INDEX: 41.66 KG/M2 | WEIGHT: 291 LBS

## 2020-02-07 DIAGNOSIS — Z98.890 STATUS POST ARTHROSCOPIC PARTIAL MEDIAL MENISCECTOMY: Primary | ICD-10-CM

## 2020-02-07 PROCEDURE — 99024 POSTOP FOLLOW-UP VISIT: CPT | Performed by: ORTHOPAEDIC SURGERY

## 2020-02-07 NOTE — TELEPHONE ENCOUNTER
Patient called in today requesting to speak with a supervisor right away  He states this is his 7th call regarding the status of the gel injections  He even went to his appt with Dr Tavares Ramos today and there were no injections there  Raina took the call to try to de-escalate the caller, and assure him we will reach out to him  I sent an email to [employer]      Callback JX#[phone]

## 2020-02-07 NOTE — PROGRESS NOTES
Assessment:    status post left knee arthroscopy partial medial meniscectomy  On 01/14/2020    Plan:    weight-bearing as tolerated  We will refer patient physical therapy due to complaints of weakness and feeling unstable in this knee   Visco prior authorization was ordered 2 weeks ago once these are ready will bring him in for injections for Euflexxa   continue with ice and heat as needed   he may return to light duty at work however there is no light duty available that he will remain out of work until his follow-up in 4 weeks or possibly sooner if he does improve sooner than expected    Follow Up:  4  week(s)      CHIEF COMPLAINT:  Chief Complaint   Patient presents with    Left Knee - Follow-up         SUBJECTIVE:  Kane Dubon is a 49y o  year old male who presents for follow up after  Left knee arthroscopy  Today patient has  Improvements in his pain since last 2 weeks  He has been using ice and heat at night  He has been doing home exercises as instructed still feels some weakness in the left knee denies any new injuries to left knee  Denies any swelling fevers or chills          PHYSICAL EXAMINATION:  General: well developed and well nourished, alert, oriented times 3 and appears comfortable  Psychiatric: Normal    MUSCULOSKELETAL EXAMINATION:  Incision:   Incisions are well healed there is no erythema no swelling no effusion,  possible small Baker cyst  Range of Motion: As expected  Neurovascular status: Neuro intact, good cap refill        STUDIES REVIEWED:  No Studies to review      PROCEDURES PERFORMED:  Procedures  No Procedures performed today

## 2020-02-07 NOTE — LETTER
February 7, 2020     Patient: Po Schneider   YOB: 1965   Date of Visit: 2/7/2020       To Whom it May Concern:    Po Schneider is under my professional care  He was seen in my office on 2/7/2020  He  May return to work on 02/07/2020 with light duty only if there is no light duty available for him that he must remain out of work until his next follow-up visit in approximately 4 weeks from today  If you have any questions or concerns, please don't hesitate to call           Sincerely,          Maria Luz Gutierrez DO        CC: No Recipients

## 2020-02-11 NOTE — TELEPHONE ENCOUNTER
I Spoke with patient, who was appreciative of a return call to update him on the status  I provided him with the contact number for [facility]  He will be calling on his own behalf to attempt to expedite the auth process

## 2020-02-11 NOTE — TELEPHONE ENCOUNTER
Just called [facility] and hey are still verifying insurance benefits  I asked if they make request rio as Grace Boggs mentioned it was already in STAT mode she reached out to insurance dept to move this up since I mentioned patient is in a lot pain   At this point the patient is welcome to call them as well to see if they move faster with request by calling [phone] (sometimes that helps [facility] move faster)

## 2020-02-13 ENCOUNTER — EVALUATION (OUTPATIENT)
Dept: PHYSICAL THERAPY | Facility: MEDICAL CENTER | Age: 55
End: 2020-02-13
Payer: COMMERCIAL

## 2020-02-13 DIAGNOSIS — Z98.890 STATUS POST ARTHROSCOPIC PARTIAL MEDIAL MENISCECTOMY: Primary | ICD-10-CM

## 2020-02-13 PROCEDURE — 97161 PT EVAL LOW COMPLEX 20 MIN: CPT | Performed by: PHYSICAL THERAPIST

## 2020-02-13 NOTE — PROGRESS NOTES
PT Evaluation     Today's date: 2020  Patient name: Juanis Issa  : 1965  MRN: [id number]  Referring provider: Kathy Worthington DO  Dx:   Encounter Diagnosis     ICD-10-CM    1  Status post arthroscopic partial medial meniscectomy [id number] Ambulatory referral to Physical Therapy       Start Time: 1475  Stop Time: 0815  Total time in clinic (min): 40 minutes    Assessment  Assessment details: Pt is a 49 y o male who presents s/p L knee medial menisectomy on 2020  Pt presents with increased knee symptoms with activity, normal ROM of L knee and decreased balance on L LE  These impairments limit the patient from participating in work related activities at [facility], stair completion at [facility], and increased difficulty ambulating in the community secondary to limitations  I believe this patient is a good candidate for and will benefit from skilled physical therapy for LE strengthening, balance training and mechanics training to improve symptoms, improve functional ability and assist the patient to return to PLOF      Positive Prognostic Indicators: good attitude towards PT    Negative Prognostic Indicators: high co-paymeny  Impairments: activity intolerance, impaired physical strength, lacks appropriate home exercise program and pain with function  Understanding of Dx/Px/POC: good   Prognosis: good    Goals  STGs: 4 weeks  1) pt will have a SPR decreased of 2 units at [facility]rost  2) pt will have improved foto score of 10 points  3) pt will have improved L knee extension strength of 1/2 muscle grade    LTGs: 8 weeks  1) pt will be independent with HEP by D/C  2) pt will be independent with symptom management by D/C  3) pt will be able to ascend and descend 12 stairs with 0/10 pain in order to return to completing flight of stairs at PLOF by [facility]  Patient would benefit from: skilled physical therapy  Planned modality interventions: cryotherapy and thermotherapy: hydrocollator packs  Planned therapy interventions: joint mobilization, manual therapy, neuromuscular re-education, patient education, strengthening, stretching, therapeutic activities, therapeutic exercise and home exercise program  Frequency: 1x week  Duration in visits: 6  Duration in weeks: 6  Plan of Care beginning date: 2/13/2020  Plan of Care expiration date: 3/26/2020  Treatment plan discussed with: patient        Subjective Evaluation    History of Present Illness  Mechanism of injury: DOO:  MILY:      Subjective Comments: Pt states that he continues to have pain  Turning the knee hurts  If he puts the leg straight, it feels it is going back wards  Pt states that he walks a mile on the track everyday  He would like to know how to strengthen his legs  Denies numbness and tingling in the leg  Denies knee buckling  Pt has to climb ladder a lot  Denies hip and ankle issues  Pain   Rest: 0/10   Best:0/10   Worst: 2/10    Relieving Factors: heating pad    Exacerbating Factors: twisting    Sleeping: independent    Home Set-up: pt states that going up and down stairs is bothersome  ADLs: independent    Work/Hobbies:      Previous Treatment: n/a    Goals:  Complete ladder for work  Learn how to strengthen the knee            Objective     Observations   Left Knee   Positive for edema  Additional Observation Details  Increased swelling popliteal fossa    Tenderness   Left Knee   Tenderness in the medial joint line  Neurological Testing     Sensation     Knee   Left Knee   Intact: light touch    Right Knee   Intact: light touch     Active Range of Motion   Left Knee   Flexion: 126 degrees   Extension: -1 degrees     Right Knee   Flexion: 129 degrees   Extension: 0 degrees     Passive Range of Motion   Left Knee   Flexion: 129 degrees   Extension: -1 degrees     Right Knee   Flexion: 136 degrees   Extension: 0 degrees     Mobility   Patellar Mobility:   Left Knee   WFL: medial, lateral, superior and inferior  Strength/Myotome Testing     Left Hip   Planes of Motion   Flexion: 5  Abduction: 4+  Adduction: 4+    Right Hip   Planes of Motion   Flexion: 5  Abduction: 4+  Adduction: 4+    Left Knee   Flexion: 4+  Extension: 4+    Right Knee   Flexion: 5  Extension: 5    Left Ankle/Foot   Dorsiflexion: 5  Plantar flexion: 5    Right Ankle/Foot   Dorsiflexion: 5  Plantar flexion: 5    Ambulation     Ambulation: Level Surfaces   Ambulation without assistive device: independent    Ambulation: Stairs   Ascend stairs: independent  Pattern: reciprocal  Railings: without rails  Descend stairs: independent  Pattern: reciprocal  Railings: without rails    Additional Stairs Ambulation Details  Pt has increased symptoms with descending stairs    Functional Assessment      Squat    Left within functional limits and right within functional limits       Single Leg Stance   Left: 23 seconds  Right: 30 seconds      Flowsheet Rows      Most Recent Value   PT/OT G-Codes   Current Score  36   Projected Score  67   Assessment Type  Evaluation   G code set  Other PT/OT Primary   Other PT Primary Current Status ()  CL   Other PT Primary Goal Status ()  CJ             Precautions: universal      Manual                                                                                   Exercise Diary  2/13            Mini squats x10 6"            bike             Step ups x10 6"            Lateral step ups x10 6"            SLS 30"x1 ea             clamshells             bridges             Standing hip abd/ext             Hamstring curls                                                                                                                                                                Modalities

## 2020-02-21 ENCOUNTER — TELEPHONE (OUTPATIENT)
Dept: OBGYN CLINIC | Facility: HOSPITAL | Age: 55
End: 2020-02-21

## 2020-02-21 ENCOUNTER — OFFICE VISIT (OUTPATIENT)
Dept: OBGYN CLINIC | Facility: CLINIC | Age: 55
End: 2020-02-21

## 2020-02-21 DIAGNOSIS — Z98.890 STATUS POST ARTHROSCOPIC PARTIAL MEDIAL MENISCECTOMY: Primary | ICD-10-CM

## 2020-02-21 PROCEDURE — 99024 POSTOP FOLLOW-UP VISIT: CPT | Performed by: ORTHOPAEDIC SURGERY

## 2020-02-21 NOTE — TELEPHONE ENCOUNTER
Patient called to rs his PO appt with Dr Roxi Bianchi today    Call was disconnected while patient was speaking  I tried calling abck 2x   Message was left to call us back to see how we can accommodate patient

## 2020-02-21 NOTE — PROGRESS NOTES
Assessment/Plan:  Patient ID: [place] Merklinger 49 y o  male   Surgery: Knee Arthroscopic Meniscectomy Medial; Partial Synovectomy - Left  Date of Surgery: 1/14/2020    5 weeks s/p left knee arthroscopic medial menisectomy, partial synovectomy  Overall [place] is doing well  He has no restrictions at this time, a work not was provided today  He was advised to try and limit his amount of kneeling  I would recommend proceeding with the Visco injection, once approved  Follow up in the office once Visco injection is approved  Follow Up:  PRN    To Do Next Visit:  Re-evaluation       CHIEF COMPLAINT:  Chief Complaint   Patient presents with    Left Knee - Pain         SUBJECTIVE:  Juanis Issa is a 49y o  year old male who presents for follow up after Knee Arthroscopic Meniscectomy Medial; Partial Synovectomy - Left  Overall [place] is doing well  He has been attending PT  He states that his knee is feeling better  He did call on his own behave with regards to his visco injection         PHYSICAL EXAMINATION:  General: well developed and well nourished, alert, oriented times 3 and appears comfortable  Psychiatric: Normal    MUSCULOSKELETAL EXAMINATION:  Incision: healed  Surgery Site: normal, no evidence of infection, minimal scar tissue  Range of Motion: full flexion and extension   Neurovascular status: Neuro intact, good cap refill  Activity Restrictions: No restrictions      STUDIES REVIEWED:  No Studies to review      PROCEDURES PERFORMED:  Procedures  No Procedures performed today       Scribe Attestation    I,:   Suzette Whitehead am acting as a scribe while in the presence of the attending physician :        I,:   Hal Ferrara DO personally performed the services described in this documentation    as scribed in my presence :

## 2020-02-21 NOTE — LETTER
February 21, 2020     Patient: Kane Dubon   YOB: 1965   Date of Visit: 2/21/2020       To Whom it May Concern:    Kane Dubon is under my professional care  He was seen in my office on 2/21/2020  He may return to work full duty, no restrictions  If you have any questions or concerns, please don't hesitate to call           Sincerely,          Santana Stubbs, DO

## 2020-02-21 NOTE — LETTER
February 21, 2020     Patient: Nadine Jara   YOB: 1965   Date of Visit: 2/21/2020       To Whom it May Concern:    Nadine Jara is under my professional care  He was seen in my office on 2/21/2020  He may return to work Monday, 02/24/2020, full duty, no restrictions  If you have any questions or concerns, please don't hesitate to call           Sincerely,          Odin Mitchell, DO

## 2020-02-25 NOTE — TELEPHONE ENCOUNTER
Patient had to cancel visco injection appt on 3/2 (scheduled by Wilton Ramsey) due to work schedule change  It is now 3/9 and he states [facility] called confirming everything is approved  Please call patient if there is any problem with injection

## 2020-02-25 NOTE — TELEPHONE ENCOUNTER
Delivery has been scheduled with Carly for 2/26/20 to [facility]   Once received, they will be sent to the [facility] office for the patient's 3/9 appointment

## 2020-03-09 ENCOUNTER — OFFICE VISIT (OUTPATIENT)
Dept: OBGYN CLINIC | Facility: CLINIC | Age: 55
End: 2020-03-09
Payer: COMMERCIAL

## 2020-03-09 DIAGNOSIS — M17.12 ARTHRITIS OF LEFT KNEE: ICD-10-CM

## 2020-03-09 DIAGNOSIS — S83.242D TEAR OF MEDIAL MENISCUS OF LEFT KNEE, CURRENT, UNSPECIFIED TEAR TYPE, SUBSEQUENT ENCOUNTER: Primary | ICD-10-CM

## 2020-03-09 PROCEDURE — 20610 DRAIN/INJ JOINT/BURSA W/O US: CPT | Performed by: ORTHOPAEDIC SURGERY

## 2020-03-09 RX ORDER — HYALURONATE SODIUM 10 MG/ML
20 SYRINGE (ML) INTRAARTICULAR
Status: COMPLETED | OUTPATIENT
Start: 2020-03-09 | End: 2020-03-09

## 2020-03-09 RX ORDER — CLOBETASOL PROPIONATE 0.46 MG/ML
SOLUTION TOPICAL
COMMUNITY
Start: 2020-02-29

## 2020-03-09 RX ADMIN — Medication 20 MG: at 17:56

## 2020-03-09 NOTE — PROGRESS NOTES
1  Tear of medial meniscus of left knee, current, unspecified tear type, subsequent encounter     2  Arthritis of left knee       Patient is here for his 1st injection of Euflexxa into the left knee  Patient reports pain left knee  All organ systems normal  Physical exam of the knee shows no effusion no ecchymosis        Large joint arthrocentesis: L knee  Date/Time: 3/9/2020 5:56 PM  Consent given by: patient  Timeout: Immediately prior to procedure a time out was called to verify the correct patient, procedure, equipment, support staff and site/side marked as required   Supporting Documentation  Indications: pain   Procedure Details  Location: knee - L knee  Needle size: 22 G  Ultrasound guidance: no  Approach: anterolateral  Medications administered: 20 mg Sodium Hyaluronate 20 MG/2ML    Patient tolerance: patient tolerated the procedure well with no immediate complications          Patient tolerated procedure follow up only

## 2020-03-26 NOTE — PROGRESS NOTES
Pt was seen for evaluation on 2/13/2020  Pt did not present for follow up visits and has made no communication with PT about continuing PT services    At this time, this patient will be DC from PT

## 2020-05-06 ENCOUNTER — TELEPHONE (OUTPATIENT)
Dept: OBGYN CLINIC | Facility: HOSPITAL | Age: 55
End: 2020-05-06

## 2020-06-01 ENCOUNTER — OFFICE VISIT (OUTPATIENT)
Dept: OBGYN CLINIC | Facility: CLINIC | Age: 55
End: 2020-06-01
Payer: COMMERCIAL

## 2020-06-01 VITALS — HEIGHT: 70 IN | BODY MASS INDEX: 41.66 KG/M2 | WEIGHT: 291 LBS

## 2020-06-01 DIAGNOSIS — M17.12 ARTHRITIS OF LEFT KNEE: Primary | ICD-10-CM

## 2020-06-01 PROCEDURE — 20610 DRAIN/INJ JOINT/BURSA W/O US: CPT | Performed by: ORTHOPAEDIC SURGERY

## 2020-06-01 RX ORDER — HYALURONATE SODIUM 10 MG/ML
20 SYRINGE (ML) INTRAARTICULAR
Status: COMPLETED | OUTPATIENT
Start: 2020-06-01 | End: 2020-06-01

## 2020-06-01 RX ADMIN — Medication 20 MG: at 09:46

## 2020-06-08 ENCOUNTER — OFFICE VISIT (OUTPATIENT)
Dept: OBGYN CLINIC | Facility: CLINIC | Age: 55
End: 2020-06-08
Payer: COMMERCIAL

## 2020-06-08 DIAGNOSIS — M17.12 PRIMARY OSTEOARTHRITIS OF LEFT KNEE: Primary | ICD-10-CM

## 2020-06-08 PROCEDURE — 20610 DRAIN/INJ JOINT/BURSA W/O US: CPT | Performed by: ORTHOPAEDIC SURGERY

## 2020-06-08 RX ORDER — HYALURONATE SODIUM 10 MG/ML
20 SYRINGE (ML) INTRAARTICULAR
Status: COMPLETED | OUTPATIENT
Start: 2020-06-08 | End: 2020-06-08

## 2020-06-08 RX ADMIN — Medication 20 MG: at 15:52

## 2020-11-20 ENCOUNTER — TELEPHONE (OUTPATIENT)
Dept: UROLOGY | Facility: CLINIC | Age: 55
End: 2020-11-20

## 2021-04-08 DIAGNOSIS — Z23 ENCOUNTER FOR IMMUNIZATION: ICD-10-CM

## 2021-04-15 ENCOUNTER — OFFICE VISIT (OUTPATIENT)
Dept: URGENT CARE | Facility: MEDICAL CENTER | Age: 56
End: 2021-04-15
Payer: COMMERCIAL

## 2021-04-15 ENCOUNTER — TELEPHONE (OUTPATIENT)
Dept: URGENT CARE | Facility: MEDICAL CENTER | Age: 56
End: 2021-04-15

## 2021-04-15 VITALS
TEMPERATURE: 97.7 F | BODY MASS INDEX: 45.1 KG/M2 | OXYGEN SATURATION: 94 % | HEART RATE: 97 BPM | HEIGHT: 70 IN | WEIGHT: 315 LBS | SYSTOLIC BLOOD PRESSURE: 138 MMHG | DIASTOLIC BLOOD PRESSURE: 79 MMHG | RESPIRATION RATE: 22 BRPM

## 2021-04-15 DIAGNOSIS — J45.41 MODERATE PERSISTENT ASTHMA WITH ACUTE EXACERBATION: Primary | ICD-10-CM

## 2021-04-15 PROCEDURE — 99213 OFFICE O/P EST LOW 20 MIN: CPT | Performed by: PHYSICIAN ASSISTANT

## 2021-04-15 RX ORDER — PREDNISONE 20 MG/1
20 TABLET ORAL 2 TIMES DAILY WITH MEALS
Qty: 10 TABLET | Refills: 0 | Status: SHIPPED | OUTPATIENT
Start: 2021-04-15 | End: 2021-04-20

## 2021-04-15 NOTE — PROGRESS NOTES
[facility]        NAME: Odilia Marion is a 48 y o  male  : 1965    MRN: [id number]  DATE: April 15, 2021  TIME: 5:13 PM    Assessment and Plan   Moderate persistent asthma with acute exacerbation [J45 41]  1  Moderate persistent asthma with acute exacerbation  predniSONE 20 mg tablet         Patient Instructions     1  Continue Advair 250/50 1 inhalation twice daily  2  Continue combivent daily  3  Continue Singulair daily  4  Use albuterol 2 puffs every 4-6 hours until cough free  5  Add Prednisone 20mg  Twice daily x 5 days  6  Recommend follow-up with Pulmonology if symptoms persist        Chief Complaint     Chief Complaint   Patient presents with    Cough     due to asthma          History of Present Illness       Emilia Dumont is a 23-year-old male who presents with a 5 day history of increasing cough, chest tightness and nasal congestion  Patient reports seasonal allergic rhinitis and asthma that worsened over the past 2 weeks  He was seen by his pulmonologist and is currently on Advair 250/51 inhalation twice daily, Combivent, Singulair and albuterol  Patient reports he has been using his albuterol inhaler every 4 hours with minimal improvement of his cough, he denies any shortness of breath or chest pain  Patient denies any fever, chills, body aches,  Vomiting or diarrhea since the onset of his symptoms  Review of Systems   Review of Systems   Constitutional: Negative  HENT: Positive for congestion  Respiratory: Positive for cough and chest tightness  Cardiovascular: Negative  Gastrointestinal: Negative            Current Medications       Current Outpatient Medications:     albuterol (PROVENTIL HFA,VENTOLIN HFA) 90 mcg/act inhaler, Inhale 2 puffs every 6 (six) hours as needed for wheezing or shortness of breath (Patient taking differently: Inhale 2 puffs as needed for wheezing or shortness of breath  ), Disp: 1 Inhaler, Rfl: 0    Ascorbic Acid (VITAMIN C) 100 MG tablet, Take 100 mg by mouth daily, Disp: , Rfl:     clobetasol (TEMOVATE) 0 05 % external solution, apply to scalp twice a day, Disp: , Rfl:     escitalopram (LEXAPRO) 10 mg tablet, , Disp: , Rfl: 0    fluticasone-salmeterol (ADVAIR) 250-50 mcg/dose inhaler, Inhale 1 puff every 12 (twelve) hours (Patient taking differently: Inhale 1 puff as needed  ), Disp: 1 Inhaler, Rfl: 0    loratadine (CLARITIN) 10 mg tablet, Take 10 mg by mouth, Disp: , Rfl:     montelukast (SINGULAIR) 10 mg tablet, Take 1 tablet (10 mg total) by mouth daily at bedtime, Disp: 30 tablet, Rfl: 2    fluticasone (FLONASE) 50 mcg/act nasal spray, 1 spray into each nostril daily (Patient not taking: Reported on 4/15/2021), Disp: 1 Bottle, Rfl: 1    hydrOXYzine HCL (ATARAX) 25 mg tablet, Take 25 mg by mouth every 6 (six) hours as needed, Disp: , Rfl:     LORazepam (ATIVAN) 1 mg tablet, Take 1 mg by mouth, Disp: , Rfl:     meloxicam (MOBIC) 7 5 mg tablet, Take 1 tablet (7 5 mg total) by mouth daily (Patient not taking: Reported on 4/15/2021), Disp: 30 tablet, Rfl: 1    methylPREDNISolone 4 MG tablet therapy pack, as directed Taper dose per instructions inside package, Disp: , Rfl: 0    Multiple Vitamin (MULTIVITAMIN) tablet, Take 1 tablet by mouth daily, Disp: , Rfl:     predniSONE 20 mg tablet, Take 1 tablet (20 mg total) by mouth 2 (two) times a day with meals for 5 days, Disp: 10 tablet, Rfl: 0    promethazine-codeine (PHENERGAN WITH CODEINE) 6 25-10 mg/5 mL syrup, Take 5 mL by mouth every 4 (four) hours as needed for cough (Patient not taking: Reported on 4/15/2021), Disp: 120 mL, Rfl: 0    Current Allergies     Allergies as of 04/15/2021 - Reviewed 04/15/2021   Allergen Reaction Noted    Cat hair extract Sneezing 08/17/2018            The following portions of the patient's history were reviewed and updated as appropriate: allergies, current medications, past family history, past medical history, past social history, past surgical history and problem list      Past Medical History:   Diagnosis Date    Allergic     Left knee pain     Seasonal allergies     Wears glasses        Past Surgical History:   Procedure Laterality Date    COLONOSCOPY      [place] EXCIS KNEE CARTILAGE,MEDIAL OR LAT Left 1/14/2020    Procedure: KNEE ARTHROSCOPIC MENISCECTOMY MEDIAL; PARTIAL SYNOVECTOMY;  Surgeon: Vahid Lozano DO;  Location: [facility];  Service: Orthopedics       Family History   Problem Relation Age of Onset    No Known Problems Mother          Medications have been verified  Objective   /79   Pulse 97   Temp 97 7 °F (36 5 °C)   Resp 22   Ht 5' 10" (1 778 m)   Wt (!) 144 kg (318 lb)   SpO2 94%   BMI 45 63 kg/m²   No LMP for male patient  Physical Exam     Physical Exam  Constitutional:       General: He is not in acute distress  Appearance: Normal appearance  HENT:      Head: Normocephalic and atraumatic  Right Ear: Tympanic membrane and ear canal normal       Left Ear: Tympanic membrane and ear canal normal       Nose: Nose normal       Mouth/Throat:      Lips: Pink  Pharynx: Oropharynx is clear  Cardiovascular:      Rate and Rhythm: Normal rate and regular rhythm  Pulses: Normal pulses  Heart sounds: Normal heart sounds, S1 normal and S2 normal  No murmur  Pulmonary:      Effort: Pulmonary effort is normal       Breath sounds: Wheezing present  No decreased breath sounds, rhonchi or rales  Neurological:      Mental Status: He is alert

## 2021-04-15 NOTE — TELEPHONE ENCOUNTER
Patient called and requested refill of Advair 250/50  Prescription was sent to [facility] on [address]    Patient was advised to follow-up if symptoms worsen or persist

## 2021-04-15 NOTE — PATIENT INSTRUCTIONS
1  Continue Advair 250/50 1 inhalation twice daily  2  Continue combivent daily  3  Continue Singulair daily  4  Use albuterol 2 puffs every 4-6 hours until cough free  5  Add Prednisone 20mg  Twice daily x 5 days  6   Recommend follow-up with Pulmonology if symptoms persist

## 2021-07-15 ENCOUNTER — HOSPITAL ENCOUNTER (EMERGENCY)
Facility: HOSPITAL | Age: 56
Discharge: HOME | End: 2021-07-15
Attending: SURGERY | Admitting: SURGERY
Payer: COMMERCIAL

## 2021-07-15 ENCOUNTER — APPOINTMENT (EMERGENCY)
Dept: CT IMAGING | Facility: HOSPITAL | Age: 56
End: 2021-07-15
Payer: COMMERCIAL

## 2021-07-15 ENCOUNTER — APPOINTMENT (EMERGENCY)
Dept: RADIOLOGY | Facility: HOSPITAL | Age: 56
End: 2021-07-15
Payer: COMMERCIAL

## 2021-07-15 VITALS
OXYGEN SATURATION: 94 % | RESPIRATION RATE: 18 BRPM | DIASTOLIC BLOOD PRESSURE: 68 MMHG | HEART RATE: 54 BPM | WEIGHT: 300 LBS | TEMPERATURE: 98 F | BODY MASS INDEX: 43.05 KG/M2 | SYSTOLIC BLOOD PRESSURE: 152 MMHG

## 2021-07-15 DIAGNOSIS — S05.92XA LEFT EYE INJURY, INITIAL ENCOUNTER: Primary | ICD-10-CM

## 2021-07-15 LAB
BASE EXCESS BLDA CALC-SCNC: 0 MMOL/L (ref -2–3)
GLUCOSE SERPL-MCNC: 121 MG/DL (ref 65–140)
GLUCOSE SERPL-MCNC: 127 MG/DL (ref 65–140)
HCO3 BLDA-SCNC: 25.5 MMOL/L (ref 24–30)
HCT VFR BLD CALC: 38 % (ref 36.5–49.3)
HGB BLDA-MCNC: 12.9 G/DL (ref 12–17)
PCO2 BLD: 27 MMOL/L (ref 21–32)
PCO2 BLD: 43.9 MM HG (ref 42–50)
PH BLD: 7.37 [PH] (ref 7.3–7.4)
PO2 BLD: 25 MM HG (ref 35–45)
POTASSIUM BLD-SCNC: 4.2 MMOL/L (ref 3.5–5.3)
SAO2 % BLD FROM PO2: 43 % (ref 60–85)
SODIUM BLD-SCNC: 144 MMOL/L (ref 136–145)
SPECIMEN SOURCE: ABNORMAL

## 2021-07-15 PROCEDURE — 82803 BLOOD GASES ANY COMBINATION: CPT

## 2021-07-15 PROCEDURE — 84132 ASSAY OF SERUM POTASSIUM: CPT

## 2021-07-15 PROCEDURE — 96375 TX/PRO/DX INJ NEW DRUG ADDON: CPT

## 2021-07-15 PROCEDURE — 82947 ASSAY GLUCOSE BLOOD QUANT: CPT

## 2021-07-15 PROCEDURE — 70450 CT HEAD/BRAIN W/O DYE: CPT

## 2021-07-15 PROCEDURE — 84295 ASSAY OF SERUM SODIUM: CPT

## 2021-07-15 PROCEDURE — 85014 HEMATOCRIT: CPT

## 2021-07-15 PROCEDURE — 82948 REAGENT STRIP/BLOOD GLUCOSE: CPT

## 2021-07-15 PROCEDURE — 96376 TX/PRO/DX INJ SAME DRUG ADON: CPT

## 2021-07-15 PROCEDURE — 99284 EMERGENCY DEPT VISIT MOD MDM: CPT

## 2021-07-15 PROCEDURE — NC001 PR NO CHARGE: Performed by: EMERGENCY MEDICINE

## 2021-07-15 PROCEDURE — 99284 EMERGENCY DEPT VISIT MOD MDM: CPT | Performed by: SURGERY

## 2021-07-15 PROCEDURE — 96374 THER/PROPH/DIAG INJ IV PUSH: CPT

## 2021-07-15 RX ORDER — TROPICAMIDE 10 MG/ML
1 SOLUTION/ DROPS OPHTHALMIC ONCE
Status: COMPLETED | OUTPATIENT
Start: 2021-07-15 | End: 2021-07-15

## 2021-07-15 RX ORDER — TROPICAMIDE 10 MG/ML
1 SOLUTION/ DROPS OPHTHALMIC ONCE
Status: DISCONTINUED | OUTPATIENT
Start: 2021-07-15 | End: 2021-07-15 | Stop reason: HOSPADM

## 2021-07-15 RX ORDER — HYDROMORPHONE HCL/PF 1 MG/ML
1 SYRINGE (ML) INJECTION
Status: DISCONTINUED | OUTPATIENT
Start: 2021-07-15 | End: 2021-07-15 | Stop reason: HOSPADM

## 2021-07-15 RX ORDER — FENTANYL CITRATE 50 UG/ML
50 INJECTION, SOLUTION INTRAMUSCULAR; INTRAVENOUS ONCE
Status: COMPLETED | OUTPATIENT
Start: 2021-07-15 | End: 2021-07-15

## 2021-07-15 RX ORDER — TETRACAINE HYDROCHLORIDE 5 MG/ML
2 SOLUTION OPHTHALMIC ONCE
Status: COMPLETED | OUTPATIENT
Start: 2021-07-15 | End: 2021-07-15

## 2021-07-15 RX ORDER — OFLOXACIN 3 MG/ML
1 SOLUTION/ DROPS OPHTHALMIC 3 TIMES DAILY
Status: DISCONTINUED | OUTPATIENT
Start: 2021-07-15 | End: 2021-07-15 | Stop reason: HOSPADM

## 2021-07-15 RX ORDER — OFLOXACIN 3 MG/ML
1 SOLUTION/ DROPS OPHTHALMIC 3 TIMES DAILY
Qty: 5 ML | Refills: 0 | Status: SHIPPED | OUTPATIENT
Start: 2021-07-15 | End: 2021-07-20

## 2021-07-15 RX ORDER — PREDNISOLONE ACETATE 10 MG/ML
1 SUSPENSION/ DROPS OPHTHALMIC
Status: DISCONTINUED | OUTPATIENT
Start: 2021-07-15 | End: 2021-07-15 | Stop reason: HOSPADM

## 2021-07-15 RX ORDER — HYDROMORPHONE HCL/PF 1 MG/ML
0.5 SYRINGE (ML) INJECTION
Status: DISCONTINUED | OUTPATIENT
Start: 2021-07-15 | End: 2021-07-15

## 2021-07-15 RX ADMIN — HYDROMORPHONE HYDROCHLORIDE 1 MG: 1 INJECTION, SOLUTION INTRAMUSCULAR; INTRAVENOUS; SUBCUTANEOUS at 05:26

## 2021-07-15 RX ADMIN — TETRACAINE HYDROCHLORIDE 2 DROP: 5 SOLUTION OPHTHALMIC at 05:37

## 2021-07-15 RX ADMIN — TROPICAMIDE 1 DROP: 10 SOLUTION/ DROPS OPHTHALMIC at 02:45

## 2021-07-15 RX ADMIN — HYDROMORPHONE HYDROCHLORIDE 0.5 MG: 1 INJECTION, SOLUTION INTRAMUSCULAR; INTRAVENOUS; SUBCUTANEOUS at 03:12

## 2021-07-15 RX ADMIN — HYDROMORPHONE HYDROCHLORIDE 1 MG: 1 INJECTION, SOLUTION INTRAMUSCULAR; INTRAVENOUS; SUBCUTANEOUS at 08:11

## 2021-07-15 RX ADMIN — PREDNISOLONE ACETATE 1 DROP: 10 SUSPENSION/ DROPS OPHTHALMIC at 02:45

## 2021-07-15 RX ADMIN — PREDNISOLONE ACETATE 1 DROP: 10 SUSPENSION/ DROPS OPHTHALMIC at 05:15

## 2021-07-15 RX ADMIN — PREDNISOLONE ACETATE 1 DROP: 10 SUSPENSION/ DROPS OPHTHALMIC at 06:07

## 2021-07-15 RX ADMIN — PREDNISOLONE ACETATE 1 DROP: 10 SUSPENSION/ DROPS OPHTHALMIC at 04:00

## 2021-07-15 RX ADMIN — FENTANYL CITRATE 50 MCG: 50 INJECTION INTRAMUSCULAR; INTRAVENOUS at 04:38

## 2021-07-15 RX ADMIN — FENTANYL CITRATE 50 MCG: 50 INJECTION INTRAMUSCULAR; INTRAVENOUS at 02:21

## 2021-07-15 RX ADMIN — PREDNISOLONE ACETATE 1 DROP: 10 SUSPENSION/ DROPS OPHTHALMIC at 07:26

## 2021-07-15 NOTE — CONSULTS
This 61-year-old gentleman is status post heat in the left eye with a golf ball last evening  He complains of pain and decreased vision  On examination, there is a 3 mm epithelial defect nasally adjacent to the limbus in the left eye  There is a small subconjunctival hemorrhage  The anterior chamber is formed  The pupil is pharmacologically dilated  The media is clear  The posterior pole is unremarkable  Impression:  Traumatic iritis, corneal abrasion left eye  Plan:  Please begin ofloxacin drops t i d , discharge the patient to my office

## 2021-07-15 NOTE — QUICK NOTE
Patient with ongoing severe pain unrelieved by IV narcotic  I discussed options with Dr Courtney Cardenas who recommended corneal numbing, if improvement likely corneal abrasion  Tetracaine 0 5% 2 drops applied to left eye with improvement in pain   Dr Courtney Cardenas to see patient this am
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
## 2023-01-17 ENCOUNTER — TELEPHONE (OUTPATIENT)
Dept: UROLOGY | Facility: MEDICAL CENTER | Age: 58
End: 2023-01-17

## 2023-04-03 ENCOUNTER — APPOINTMENT (EMERGENCY)
Dept: RADIOLOGY | Facility: HOSPITAL | Age: 58
End: 2023-04-03

## 2023-04-03 ENCOUNTER — HOSPITAL ENCOUNTER (OUTPATIENT)
Facility: HOSPITAL | Age: 58
Setting detail: OBSERVATION
Discharge: LEFT BEFORE DISCHARGE | End: 2023-04-04
Attending: EMERGENCY MEDICINE | Admitting: INTERNAL MEDICINE

## 2023-04-03 DIAGNOSIS — R07.9 CHEST PAIN: ICD-10-CM

## 2023-04-03 DIAGNOSIS — E78.00 HYPERCHOLESTEREMIA: ICD-10-CM

## 2023-04-03 DIAGNOSIS — J18.9 PNEUMONIA: Primary | ICD-10-CM

## 2023-04-03 DIAGNOSIS — J01.01 ACUTE RECURRENT MAXILLARY SINUSITIS: ICD-10-CM

## 2023-04-03 PROBLEM — R94.31 ABNORMAL EKG: Status: ACTIVE | Noted: 2023-04-03

## 2023-04-03 PROBLEM — R01.1 SYSTOLIC MURMUR: Status: ACTIVE | Noted: 2023-04-03

## 2023-04-03 PROBLEM — R07.89 CHEST WALL PAIN: Status: ACTIVE | Noted: 2023-04-03

## 2023-04-03 PROBLEM — R65.10 SIRS (SYSTEMIC INFLAMMATORY RESPONSE SYNDROME) (HCC): Status: ACTIVE | Noted: 2023-04-03

## 2023-04-03 PROBLEM — E78.5 HYPERLIPIDEMIA: Status: ACTIVE | Noted: 2023-04-03

## 2023-04-03 LAB
2HR DELTA HS TROPONIN: 0 NG/L
ALBUMIN SERPL BCP-MCNC: 3.9 G/DL (ref 3.5–5)
ALP SERPL-CCNC: 58 U/L (ref 34–104)
ALT SERPL W P-5'-P-CCNC: 16 U/L (ref 7–52)
ANION GAP SERPL CALCULATED.3IONS-SCNC: 10 MMOL/L (ref 4–13)
AST SERPL W P-5'-P-CCNC: 15 U/L (ref 13–39)
BASOPHILS # BLD AUTO: 0.06 THOUSANDS/ÂΜL (ref 0–0.1)
BASOPHILS NFR BLD AUTO: 1 % (ref 0–1)
BILIRUB SERPL-MCNC: 0.71 MG/DL (ref 0.2–1)
BNP SERPL-MCNC: 190 PG/ML (ref 0–100)
BUN SERPL-MCNC: 13 MG/DL (ref 5–25)
CALCIUM SERPL-MCNC: 9.3 MG/DL (ref 8.4–10.2)
CARDIAC TROPONIN I PNL SERPL HS: 9 NG/L
CARDIAC TROPONIN I PNL SERPL HS: 9 NG/L
CHLORIDE SERPL-SCNC: 105 MMOL/L (ref 96–108)
CHOLEST SERPL-MCNC: 209 MG/DL
CO2 SERPL-SCNC: 23 MMOL/L (ref 21–32)
CREAT SERPL-MCNC: 0.87 MG/DL (ref 0.6–1.3)
EOSINOPHIL # BLD AUTO: 0.31 THOUSAND/ÂΜL (ref 0–0.61)
EOSINOPHIL NFR BLD AUTO: 3 % (ref 0–6)
ERYTHROCYTE [DISTWIDTH] IN BLOOD BY AUTOMATED COUNT: 14.1 % (ref 11.6–15.1)
GFR SERPL CREATININE-BSD FRML MDRD: 96 ML/MIN/1.73SQ M
GLUCOSE SERPL-MCNC: 110 MG/DL (ref 65–140)
HCT VFR BLD AUTO: 42.1 % (ref 36.5–49.3)
HDLC SERPL-MCNC: 45 MG/DL
HGB BLD-MCNC: 13.2 G/DL (ref 12–17)
IMM GRANULOCYTES # BLD AUTO: 0.06 THOUSAND/UL (ref 0–0.2)
IMM GRANULOCYTES NFR BLD AUTO: 1 % (ref 0–2)
LDLC SERPL CALC-MCNC: 119 MG/DL (ref 0–100)
LYMPHOCYTES # BLD AUTO: 1.2 THOUSANDS/ÂΜL (ref 0.6–4.47)
LYMPHOCYTES NFR BLD AUTO: 10 % (ref 14–44)
MCH RBC QN AUTO: 27.2 PG (ref 26.8–34.3)
MCHC RBC AUTO-ENTMCNC: 31.4 G/DL (ref 31.4–37.4)
MCV RBC AUTO: 87 FL (ref 82–98)
MONOCYTES # BLD AUTO: 0.89 THOUSAND/ÂΜL (ref 0.17–1.22)
MONOCYTES NFR BLD AUTO: 7 % (ref 4–12)
NEUTROPHILS # BLD AUTO: 9.6 THOUSANDS/ÂΜL (ref 1.85–7.62)
NEUTS SEG NFR BLD AUTO: 78 % (ref 43–75)
NRBC BLD AUTO-RTO: 0 /100 WBCS
PLATELET # BLD AUTO: 210 THOUSANDS/UL (ref 149–390)
PMV BLD AUTO: 10.8 FL (ref 8.9–12.7)
POTASSIUM SERPL-SCNC: 4.2 MMOL/L (ref 3.5–5.3)
PROCALCITONIN SERPL-MCNC: 0.29 NG/ML
PROT SERPL-MCNC: 7 G/DL (ref 6.4–8.4)
RBC # BLD AUTO: 4.85 MILLION/UL (ref 3.88–5.62)
SODIUM SERPL-SCNC: 138 MMOL/L (ref 135–147)
TRIGL SERPL-MCNC: 223 MG/DL
WBC # BLD AUTO: 12.12 THOUSAND/UL (ref 4.31–10.16)

## 2023-04-03 RX ORDER — BENZONATATE 100 MG/1
200 CAPSULE ORAL 3 TIMES DAILY
Status: DISCONTINUED | OUTPATIENT
Start: 2023-04-03 | End: 2023-04-04 | Stop reason: HOSPADM

## 2023-04-03 RX ORDER — FLUTICASONE PROPIONATE 110 UG/1
2 AEROSOL, METERED RESPIRATORY (INHALATION) 2 TIMES DAILY
COMMUNITY

## 2023-04-03 RX ORDER — GUAIFENESIN 600 MG/1
600 TABLET, EXTENDED RELEASE ORAL ONCE
Status: COMPLETED | OUTPATIENT
Start: 2023-04-03 | End: 2023-04-03

## 2023-04-03 RX ORDER — PANTOPRAZOLE SODIUM 40 MG/1
40 TABLET, DELAYED RELEASE ORAL
Status: DISCONTINUED | OUTPATIENT
Start: 2023-04-04 | End: 2023-04-04 | Stop reason: HOSPADM

## 2023-04-03 RX ORDER — FLUTICASONE PROPIONATE 50 MCG
2 SPRAY, SUSPENSION (ML) NASAL 2 TIMES DAILY
Status: DISCONTINUED | OUTPATIENT
Start: 2023-04-04 | End: 2023-04-04 | Stop reason: HOSPADM

## 2023-04-03 RX ORDER — ASPIRIN 325 MG
325 TABLET ORAL ONCE
Status: COMPLETED | OUTPATIENT
Start: 2023-04-03 | End: 2023-04-03

## 2023-04-03 RX ORDER — ATORVASTATIN CALCIUM 40 MG/1
40 TABLET, FILM COATED ORAL
Status: DISCONTINUED | OUTPATIENT
Start: 2023-04-03 | End: 2023-04-04 | Stop reason: HOSPADM

## 2023-04-03 RX ORDER — ASPIRIN 81 MG/1
162 TABLET ORAL DAILY
COMMUNITY

## 2023-04-03 RX ORDER — ACETAMINOPHEN 325 MG/1
650 TABLET ORAL EVERY 4 HOURS PRN
Status: DISCONTINUED | OUTPATIENT
Start: 2023-04-03 | End: 2023-04-04 | Stop reason: HOSPADM

## 2023-04-03 RX ORDER — ASPIRIN 81 MG/1
81 TABLET, CHEWABLE ORAL DAILY
Status: DISCONTINUED | OUTPATIENT
Start: 2023-04-04 | End: 2023-04-04 | Stop reason: HOSPADM

## 2023-04-03 RX ORDER — METHOCARBAMOL 500 MG/1
500 TABLET, FILM COATED ORAL 3 TIMES DAILY
Status: DISCONTINUED | OUTPATIENT
Start: 2023-04-03 | End: 2023-04-04 | Stop reason: HOSPADM

## 2023-04-03 RX ORDER — HYDROCODONE BITARTRATE AND HOMATROPINE METHYLBROMIDE ORAL SOLUTION 5; 1.5 MG/5ML; MG/5ML
5 LIQUID ORAL EVERY 4 HOURS PRN
Status: DISCONTINUED | OUTPATIENT
Start: 2023-04-03 | End: 2023-04-04 | Stop reason: HOSPADM

## 2023-04-03 RX ADMIN — ASPIRIN 325 MG ORAL TABLET 325 MG: 325 PILL ORAL at 21:02

## 2023-04-03 RX ADMIN — GUAIFENESIN 600 MG: 600 TABLET ORAL at 21:02

## 2023-04-03 NOTE — Clinical Note
Case was discussed SLIM resident and the patient's admission status was agreed to be Admission Status: observation status to the service of   ____

## 2023-04-04 ENCOUNTER — APPOINTMENT (OUTPATIENT)
Dept: NON INVASIVE DIAGNOSTICS | Facility: HOSPITAL | Age: 58
End: 2023-04-04

## 2023-04-04 VITALS
HEART RATE: 67 BPM | OXYGEN SATURATION: 95 % | BODY MASS INDEX: 45.1 KG/M2 | RESPIRATION RATE: 18 BRPM | SYSTOLIC BLOOD PRESSURE: 135 MMHG | WEIGHT: 315 LBS | TEMPERATURE: 98.7 F | HEIGHT: 70 IN | DIASTOLIC BLOOD PRESSURE: 77 MMHG

## 2023-04-04 PROBLEM — T78.40XA ALLERGIES: Status: ACTIVE | Noted: 2023-04-04

## 2023-04-04 LAB
4HR DELTA HS TROPONIN: -2 NG/L
ALBUMIN SERPL BCP-MCNC: 3.6 G/DL (ref 3.5–5)
ALP SERPL-CCNC: 49 U/L (ref 34–104)
ALT SERPL W P-5'-P-CCNC: 13 U/L (ref 7–52)
ANION GAP SERPL CALCULATED.3IONS-SCNC: 7 MMOL/L (ref 4–13)
AORTIC ROOT: 3.5 CM
APICAL FOUR CHAMBER EJECTION FRACTION: 64 %
ASCENDING AORTA: 3.1 CM
AST SERPL W P-5'-P-CCNC: 10 U/L (ref 13–39)
BASOPHILS # BLD AUTO: 0.05 THOUSANDS/ÂΜL (ref 0–0.1)
BASOPHILS NFR BLD AUTO: 1 % (ref 0–1)
BILIRUB SERPL-MCNC: 0.83 MG/DL (ref 0.2–1)
BUN SERPL-MCNC: 13 MG/DL (ref 5–25)
CALCIUM SERPL-MCNC: 8.9 MG/DL (ref 8.4–10.2)
CARDIAC TROPONIN I PNL SERPL HS: 7 NG/L
CHLORIDE SERPL-SCNC: 106 MMOL/L (ref 96–108)
CO2 SERPL-SCNC: 24 MMOL/L (ref 21–32)
CREAT SERPL-MCNC: 0.7 MG/DL (ref 0.6–1.3)
E WAVE DECELERATION TIME: 230 MS
EOSINOPHIL # BLD AUTO: 0.36 THOUSAND/ÂΜL (ref 0–0.61)
EOSINOPHIL NFR BLD AUTO: 4 % (ref 0–6)
ERYTHROCYTE [DISTWIDTH] IN BLOOD BY AUTOMATED COUNT: 14.4 % (ref 11.6–15.1)
EST. AVERAGE GLUCOSE BLD GHB EST-MCNC: 120 MG/DL
FRACTIONAL SHORTENING: 37 % (ref 28–44)
GFR SERPL CREATININE-BSD FRML MDRD: 105 ML/MIN/1.73SQ M
GLUCOSE P FAST SERPL-MCNC: 127 MG/DL (ref 65–99)
GLUCOSE SERPL-MCNC: 127 MG/DL (ref 65–140)
HBA1C MFR BLD: 5.8 %
HCT VFR BLD AUTO: 39.2 % (ref 36.5–49.3)
HGB BLD-MCNC: 12.4 G/DL (ref 12–17)
IMM GRANULOCYTES # BLD AUTO: 0.03 THOUSAND/UL (ref 0–0.2)
IMM GRANULOCYTES NFR BLD AUTO: 0 % (ref 0–2)
INTERVENTRICULAR SEPTUM IN DIASTOLE (PARASTERNAL SHORT AXIS VIEW): 1.3 CM
INTERVENTRICULAR SEPTUM: 1.3 CM (ref 0.6–1.1)
LAAS-AP2: 19.2 CM2
LAAS-AP4: 22.4 CM2
LEFT ATRIUM SIZE: 4.2 CM
LEFT INTERNAL DIMENSION IN SYSTOLE: 3.4 CM (ref 2.1–4)
LEFT VENTRICULAR INTERNAL DIMENSION IN DIASTOLE: 5.4 CM (ref 3.5–6)
LEFT VENTRICULAR POSTERIOR WALL IN END DIASTOLE: 1.3 CM
LEFT VENTRICULAR STROKE VOLUME: 96 ML
LVSV (TEICH): 96 ML
LYMPHOCYTES # BLD AUTO: 1.22 THOUSANDS/ÂΜL (ref 0.6–4.47)
LYMPHOCYTES NFR BLD AUTO: 13 % (ref 14–44)
MAGNESIUM SERPL-MCNC: 2 MG/DL (ref 1.9–2.7)
MAX HR PERCENT: 83 %
MAX HR: 137 BPM
MCH RBC QN AUTO: 27.1 PG (ref 26.8–34.3)
MCHC RBC AUTO-ENTMCNC: 31.6 G/DL (ref 31.4–37.4)
MCV RBC AUTO: 86 FL (ref 82–98)
MONOCYTES # BLD AUTO: 0.85 THOUSAND/ÂΜL (ref 0.17–1.22)
MONOCYTES NFR BLD AUTO: 9 % (ref 4–12)
MV E'TISSUE VEL-SEP: 11 CM/S
MV PEAK A VEL: 0.8 M/S
MV PEAK E VEL: 88 CM/S
MV STENOSIS PRESSURE HALF TIME: 67 MS
MV VALVE AREA P 1/2 METHOD: 3.28 CM2
NEUTROPHILS # BLD AUTO: 6.79 THOUSANDS/ÂΜL (ref 1.85–7.62)
NEUTS SEG NFR BLD AUTO: 73 % (ref 43–75)
NRBC BLD AUTO-RTO: 0 /100 WBCS
PLATELET # BLD AUTO: 186 THOUSANDS/UL (ref 149–390)
PMV BLD AUTO: 10.8 FL (ref 8.9–12.7)
POTASSIUM SERPL-SCNC: 4.1 MMOL/L (ref 3.5–5.3)
PROCALCITONIN SERPL-MCNC: 0.22 NG/ML
PROT SERPL-MCNC: 6.6 G/DL (ref 6.4–8.4)
RATE PRESSURE PRODUCT: NORMAL
RBC # BLD AUTO: 4.57 MILLION/UL (ref 3.88–5.62)
RIGHT ATRIUM AREA SYSTOLE A4C: 15.8 CM2
RIGHT VENTRICLE ID DIMENSION: 3.3 CM
SL CV LEFT ATRIUM LENGTH A2C: 5.3 CM
SL CV LV EF: 5
SL CV PED ECHO LEFT VENTRICLE DIASTOLIC VOLUME (MOD BIPLANE) 2D: 143 ML
SL CV PED ECHO LEFT VENTRICLE SYSTOLIC VOLUME (MOD BIPLANE) 2D: 47 ML
SL CV STRESS RECOVERY BP: NORMAL MMHG
SL CV STRESS RECOVERY HR: 88 BPM
SL CV STRESS RECOVERY O2 SAT: 96 %
SL CV STRESS STAGE REACHED: 3
SODIUM SERPL-SCNC: 137 MMOL/L (ref 135–147)
STRESS ANGINA INDEX: 0
STRESS BASELINE BP: NORMAL MMHG
STRESS BASELINE HR: 70 BPM
STRESS DUKE TREADMILL SCORE: 7
STRESS O2 SAT REST: 96 %
STRESS PEAK HR: 137 BPM
STRESS POST ESTIMATED WORKLOAD: 8.5 METS
STRESS POST EXERCISE DUR MIN: 7 MIN
STRESS POST O2 SAT PEAK: 91 %
STRESS POST PEAK BP: 198 MMHG
STRESS ST DEPRESSION: 0 MM
T4 FREE SERPL-MCNC: 1.02 NG/DL (ref 0.76–1.46)
TRICUSPID ANNULAR PLANE SYSTOLIC EXCURSION: 2.1 CM
TSH SERPL DL<=0.05 MIU/L-ACNC: 1.39 UIU/ML (ref 0.45–4.5)
WBC # BLD AUTO: 9.3 THOUSAND/UL (ref 4.31–10.16)

## 2023-04-04 RX ORDER — ALBUTEROL SULFATE 90 UG/1
2 AEROSOL, METERED RESPIRATORY (INHALATION) EVERY 4 HOURS PRN
Status: DISCONTINUED | OUTPATIENT
Start: 2023-04-04 | End: 2023-04-04 | Stop reason: HOSPADM

## 2023-04-04 RX ORDER — MONTELUKAST SODIUM 10 MG/1
10 TABLET ORAL
Status: DISCONTINUED | OUTPATIENT
Start: 2023-04-04 | End: 2023-04-04 | Stop reason: HOSPADM

## 2023-04-04 RX ORDER — CEFDINIR 300 MG/1
300 CAPSULE ORAL EVERY 12 HOURS SCHEDULED
Qty: 10 CAPSULE | Refills: 0 | Status: SHIPPED | OUTPATIENT
Start: 2023-04-04 | End: 2023-04-09

## 2023-04-04 RX ORDER — ASPIRIN 81 MG/1
162 TABLET ORAL DAILY
Status: DISCONTINUED | OUTPATIENT
Start: 2023-04-04 | End: 2023-04-04

## 2023-04-04 RX ORDER — FLUTICASONE PROPIONATE 110 UG/1
2 AEROSOL, METERED RESPIRATORY (INHALATION) 2 TIMES DAILY
Status: DISCONTINUED | OUTPATIENT
Start: 2023-04-04 | End: 2023-04-04 | Stop reason: HOSPADM

## 2023-04-04 RX ADMIN — METHOCARBAMOL 500 MG: 500 TABLET ORAL at 00:27

## 2023-04-04 RX ADMIN — CEFTRIAXONE SODIUM 1000 MG: 10 INJECTION, POWDER, FOR SOLUTION INTRAVENOUS at 00:27

## 2023-04-04 RX ADMIN — FLUTICASONE PROPIONATE 2 SPRAY: 50 SPRAY, METERED NASAL at 00:27

## 2023-04-04 RX ADMIN — BENZONATATE 200 MG: 100 CAPSULE ORAL at 00:27

## 2023-04-04 RX ADMIN — METHOCARBAMOL 500 MG: 500 TABLET ORAL at 08:17

## 2023-04-04 RX ADMIN — HYDROCODONE BITARTRATE AND HOMATROPINE METHYLBROMIDE 5 ML: 5; 1.5 SOLUTION ORAL at 02:17

## 2023-04-04 RX ADMIN — BENZONATATE 200 MG: 100 CAPSULE ORAL at 08:17

## 2023-04-04 RX ADMIN — ATORVASTATIN CALCIUM 40 MG: 40 TABLET, FILM COATED ORAL at 00:27

## 2023-04-04 RX ADMIN — HYDROCODONE BITARTRATE AND HOMATROPINE METHYLBROMIDE 5 ML: 5; 1.5 SOLUTION ORAL at 06:26

## 2023-04-04 RX ADMIN — PANTOPRAZOLE SODIUM 40 MG: 40 TABLET, DELAYED RELEASE ORAL at 06:07

## 2023-04-04 RX ADMIN — FLUTICASONE PROPIONATE 2 SPRAY: 50 SPRAY, METERED NASAL at 08:17

## 2023-04-04 RX ADMIN — ASPIRIN 81 MG: 81 TABLET, CHEWABLE ORAL at 08:17

## 2023-04-04 RX ADMIN — ALBUTEROL SULFATE 2 PUFF: 90 AEROSOL, METERED RESPIRATORY (INHALATION) at 08:17

## 2023-04-04 RX ADMIN — FLUTICASONE PROPIONATE 2 PUFF: 110 AEROSOL, METERED RESPIRATORY (INHALATION) at 09:05

## 2023-04-04 RX ADMIN — ACETAMINOPHEN 650 MG: 325 TABLET ORAL at 08:21

## 2023-04-04 RX ADMIN — HYDROCODONE BITARTRATE AND HOMATROPINE METHYLBROMIDE 5 ML: 5; 1.5 SOLUTION ORAL at 11:08

## 2023-04-04 NOTE — H&P
[facility]  [facility]&P  Name: Stanley Benavides  MRN: [id number]  Unit/Bed#: S -01 I Date of Admission: 4/3/2023   Date of Service: 4/4/2023 I Hospital Day: 0      Assessment/Plan   * Abnormal EKG  Assessment & Plan  · Not have access to EKG from [facility]  · Take  daily , given 325 in ED  · EKG here showed T inversions in V2, V3, T wave flattening in V4- V6  · No left-sided chest pain radiating to neck or arm or back, only B/L lateral chest wall pain and shallow breaths  · Heart score 5, EMELINA 3   · Start statin   · Hold metoprolol and r/o HF and consider after stress test   · Will order exercise stress test, if normal can dc tomorrow on statin    Chest wall pain  Assessment & Plan  · 2/2 to cough has B/L chest pain, may be allergies vs postnasal gtt vs GERD   · Start pantoprazole 40 daily  · Flonase twice daily  · Continue allergy medications  · Robaxin 10 mg 3 times daily scheduled  · Tessalon Perles 3 times daily  · Hycodan as needed    Hyperlipidemia  Assessment & Plan  Lab Results   Component Value Date    CHOLESTEROL 209 (H) 04/03/2023    TRIG 223 (H) 04/03/2023    HDL 45 04/03/2023    LDLCALC 119 (H) 04/03/2023     · Atorvastatin 40 qd for now   · ASCVD risk 7 4% so would recommended at least a mod intensity statin on dc  · Pt told about side effects, he is amenable to continuation of statin on dc  · Will need LFTs in 4-6 weeks on dc    Allergies  Assessment & Plan  Per Allergist: [place] and [place] tree allergy   Continue Flovent, albuterol q4 hrs, for March season during flairs which he is currently undergoing       Systolic murmur  Assessment & Plan  · Echo to r/o heart failure, vavular pathology , LL edema +    SIRS (systemic inflammatory response syndrome) (HCC)  Assessment & Plan  · POA: Leukocytosis 12, heart rate greater than 90, procalcitonin 0 29 possible right-sided consolidations due to viral pneumonia vs  Aspiration vs  bacterial PNA  · Repeat procalcitonin, "if improving with improved cough may consider discontinuation of ceftriaxone         VTE Pharmacologic Prophylaxis: VTE Score: 3 Low Risk (Score 0-2) - Encourage Ambulation  Code Status: Level 1 - Full Code   Discussion with family: Patient declined call to   Anticipated Length of Stay: Patient will be admitted on an observation basis with an anticipated length of stay of less than 2 midnights secondary to B/L chest pain w/ signifcant cogh, EKG changes for stress  Chief Complaint: Abnormal EKG in Urgent care at [facility]    History of Present Illness:  Anson Couch is a 65 y o  male with a PMH of Obesity due to excess calories, HPL, who presents with     Friday, March 31, 2023  Had acid reflux  It came up and went down to the lungs and then started coughing  \"Like in a pool where you get water in\"  After 1 hour of severe coughing, when downstrairs to sleep on cough  Saturday April 1, 2023, did yard work  Went to bed at 11 am and slept for 2-3 hrs  Watched TV and sat around  Had bad resistant frontal HA and B\L lateral pain when he coughs or takes deep breaths  Swallows breaths d/t  B/L lateral chest pain continued,frontal HA continued worsening w/ cough  Did not medications to make himself feeling better  Started Albuterol every 4 hours 2 puffs each time w/ flovent as per allergist recommended as he felt SOB (allergy from box elder tree and [place] tree)- this repeats every March 2023 due to allergy season  Slept wlll Saturday night    Work up Sunday, April 2, 2023 and went to work in [employer] an hours from where he lives  Worked 12 hours  All above symptoms persistent but Started feeling weak  April 3, 2023 day of admission - went to work at 5 am and went to work  Same severity and same symptoms  Came home at 3 pm, got his teeth cleaned  Daughter recommended visit to urgent care- she suspected aspiration  Visited [facility] urgent care  He requested EKG due to severity of pain       If cough " he has 10/10 B/L lateral chest wall pain  IF doesn't cough just has HA which as been the case during the last several days  Continues to have swallow breathing due to the pain  Due to abdonomal EKG changes urgent care recommended hospitizaltion for further workup  EMELINA score 3, Heart score 5  No previous cardiac cath  Only ECHO stress in the past which showed no ischemia and normal EF in  LVH  Patient had HPL but never placed on Statin, was recommend dietary changes, and exercise  Family history:  grandfather  of CHF in [de-identified], dad has arrhthymias which was cardioverted  Component 2021 2020 02/10/2020 2018 2013           Cholesterol 204 High     226 High     232 High     231 High     249 High       CHOL/HDL Ratio 4 9 5 7 High     5 5 High     6 1 High     4 88   Cholesterol, HDL, Direct 42 40 42 38 Low     51   LDL CHOL, Direct -- -- -- -- 151 High       Cholesterol, Non- High      186 High      190 High      193 High      198 High        Triglyceride 202 High      282 High      243 High      256 High     203 High       Cholesterol, LDL, Calculated 128 High      143 High      149 High      151 High      --         Review of Systems:  Review of Systems   Constitutional: Negative for fever  HENT: Negative for ear pain and sore throat  Eyes: Negative for pain and visual disturbance  Respiratory: Positive for cough and shortness of breath  Cardiovascular: Positive for chest pain  Negative for palpitations  Gastrointestinal: Negative for abdominal pain and vomiting  Genitourinary: Negative for dysuria and hematuria  Musculoskeletal: Negative for arthralgias and back pain  Skin: Negative for color change and rash  Neurological: Positive for headaches  Negative for seizures and syncope  Psychiatric/Behavioral: Negative for confusion  The patient is not nervous/anxious  All other systems reviewed and are negative        Past Medical and Surgical History:   Past Medical History:   Diagnosis Date   • Allergic    • Left knee pain    • Prediabetes    • Seasonal allergies    • Wears glasses        Past Surgical History:   Procedure Laterality Date   • COLONOSCOPY     • [place] ARTHRT W/EXC SEMILUNAR CRTLG KNEE MEDIAL/LAT Left 1/14/2020    Procedure: KNEE ARTHROSCOPIC MENISCECTOMY MEDIAL; PARTIAL SYNOVECTOMY;  Surgeon: Leopold Gully, DO;  [facility];  Service: Orthopedics       Meds/Allergies:  Prior to Admission medications    Medication Sig Start Date End Date Taking? Authorizing Provider   albuterol (PROVENTIL HFA,VENTOLIN HFA) 90 mcg/act inhaler Inhale 2 puffs every 6 (six) hours as needed for wheezing or shortness of breath  Patient taking differently: Inhale 2 puffs as needed for wheezing or shortness of breath 5/11/18  Yes Johnathon Gagnon PA-C   Ascorbic Acid (VITAMIN C) 100 MG tablet Take 100 mg by mouth daily   Yes Historical Provider, MD   aspirin (ECOTRIN LOW STRENGTH) 81 mg EC tablet Take 162 mg by mouth daily   Yes Historical Provider, MD   fluticasone (FLOVENT HFA) 110 MCG/ACT inhaler Inhale 2 puffs 2 (two) times a day Rinse mouth after use  Yes Historical Provider, MD   montelukast (SINGULAIR) 10 mg tablet Take 1 tablet (10 mg total) by mouth daily at bedtime 4/25/18  Yes Johnathon Gagnon PA-C   Multiple Vitamin (MULTIVITAMIN) tablet Take 1 tablet by mouth daily   Yes Historical Provider, MD   clobetasol (TEMOVATE) 0 05 % external solution apply to scalp twice a day 2/29/20   Historical Provider, MD   fluticasone-salmeterol (ADVAIR) 250-50 mcg/dose inhaler Inhale 1 puff every 12 (twelve) hours  Patient taking differently: Inhale 1 puff as needed   5/11/18 4/3/23  Johnathon Gagnon PA-C   loratadine (CLARITIN) 10 mg tablet Take 10 mg by mouth  4/3/23  Historical Provider, MD     I have reviewed home medications with patient personally  Allergies:    Allergies   Allergen Reactions   • Cat Hair Extract Sneezing       Social History:  Marital Status: /Civil Union   Occupation:   Patient Pre-hospital Living Situation: Home  Patient Pre-hospital Level of Mobility: walks  Patient Pre-hospital Diet Restrictions: None  Substance Use History:   Social History     Substance and Sexual Activity   Alcohol Use No     Social History     Tobacco Use   Smoking Status Never   Smokeless Tobacco Never     Social History     Substance and Sexual Activity   Drug Use No       Family History:  Family History   Problem Relation Age of Onset   • No Known Problems Mother        Physical Exam:     Vitals:   Blood Pressure: 132/61 (04/03/23 2158)  Pulse: 81 (04/03/23 2158)  Temperature: 98 2 °F (36 8 °C) (04/03/23 1929)  Temp Source: Oral (04/03/23 1929)  Respirations: 18 (04/03/23 2158)  SpO2: 95 % (04/03/23 2158)    Physical Exam  Vitals and nursing note reviewed  Constitutional:       General: He is not in acute distress  Appearance: Normal appearance  He is well-developed  He is obese  He is not ill-appearing  HENT:      Head: Normocephalic and atraumatic  Eyes:      Extraocular Movements: Extraocular movements intact  Conjunctiva/sclera: Conjunctivae normal    Cardiovascular:      Rate and Rhythm: Normal rate and regular rhythm  Heart sounds: Murmur heard  Pulmonary:      Effort: Pulmonary effort is normal  No respiratory distress  Breath sounds: Normal breath sounds  Abdominal:      General: There is no distension  Palpations: Abdomen is soft  Tenderness: There is no abdominal tenderness  There is no guarding  Musculoskeletal:         General: No swelling  Cervical back: Neck supple  Skin:     General: Skin is warm and dry  Capillary Refill: Capillary refill takes less than 2 seconds  Neurological:      Mental Status: He is alert and oriented to person, place, and time     Psychiatric:         Mood and Affect: Mood normal          Behavior: Behavior normal           Additional Data:     Lab Results:  Results from last 7 days   Lab Units 04/03/23 1952   WBC Thousand/uL 12 12*   HEMOGLOBIN g/dL 13 2   HEMATOCRIT % 42 1   PLATELETS Thousands/uL 210   NEUTROS PCT % 78*   LYMPHS PCT % 10*   MONOS PCT % 7   EOS PCT % 3     Results from last 7 days   Lab Units 04/03/23 1952   SODIUM mmol/L 138   POTASSIUM mmol/L 4 2   CHLORIDE mmol/L 105   CO2 mmol/L 23   BUN mg/dL 13   CREATININE mg/dL 0 87   ANION GAP mmol/L 10   CALCIUM mg/dL 9 3   ALBUMIN g/dL 3 9   TOTAL BILIRUBIN mg/dL 0 71   ALK PHOS U/L 58   ALT U/L 16   AST U/L 15   GLUCOSE RANDOM mg/dL 110                 Results from last 7 days   Lab Units 04/03/23 1952   PROCALCITONIN ng/ml 0 29*       Lines/Drains:  Invasive Devices     Peripheral Intravenous Line  Duration           Peripheral IV 04/03/23 Distal;Left;Upper;Ventral (anterior) Arm <1 day                    Imaging: Reviewed radiology reports from this admission including: all imaging from this admission  XR chest 1 view portable   ED Interpretation by Chuyita Frazier PA-C (04/03 2056)   Consolidations right upper middle and lower lobe consolidations of right upper middle and lower lobe          EKG and Other Studies Reviewed on Admission:   · EKG: see above  ** Please Note: This note has been constructed using a voice recognition system   **

## 2023-04-04 NOTE — ASSESSMENT & PLAN NOTE
· Not have access to EKG from [facility]  · Take  daily , given 325 in ED  · EKG here showed T inversions in V2, V3, T wave flattening in V4- V6  · No left-sided chest pain radiating to neck or arm or back, only B/L lateral chest wall pain and shallow breaths  · Heart score 5, EMELINA 3   · Start statin   · Hold metoprolol and r/o HF and consider after stress test   · Will order exercise stress test, if normal can dc tomorrow on statin

## 2023-04-04 NOTE — DISCHARGE SUMMARY
I have seen and examined Jacquelyn Connors personally and have reviewed the medical record independently  I have reviewed the case with the resident physician including all assessments and the plan of care for each  I agree with the resident physician and offer the following addendum to the below statements by the resident physician:      Date Evaluated: 4/4/23  Time Evaluated: am     Patient admitted for abnormal EKG, right lung pneumonia  Patient feels better today, denies any chest pain  Patient underwent stress test as well  Received 1 dose of IV ceftriaxone last night  Patient does not wish to stay in the hospital any longer as he is not getting adequate rest  Discussed with the patient that he is currently receiving antibiotics and also echocardiogram and stress test reports are still pending  Patient was also counseled regarding worsening pneumonia, resulting in hypoxia, sepsis, respiratory failure, MI, death, patient understands the risks however he still wishes to go home, he signed AMA form  Patient's wife was present at the bedside and understands patient's decision  I have sent a prescription for cefdinir for 5 days to patient's pharmacy  Recommended close follow-up with family physician    Recommended to seek immediate medical attention if symptoms worsen

## 2023-04-04 NOTE — PLAN OF CARE
Problem: PAIN - ADULT  Goal: Verbalizes/displays adequate comfort level or baseline comfort level  Description: Interventions:  - Encourage patient to monitor pain and request assistance  - Assess pain using appropriate pain scale  - Administer analgesics based on type and severity of pain and evaluate response  - Implement non-pharmacological measures as appropriate and evaluate response  - Consider cultural and social influences on pain and pain management  - Notify physician/advanced practitioner if interventions unsuccessful or patient reports new pain  Outcome: Progressing     Problem: INFECTION - ADULT  Goal: Absence or prevention of progression during hospitalization  Description: INTERVENTIONS:  - Assess and monitor for signs and symptoms of infection  - Monitor lab/diagnostic results  - Monitor all insertion sites, i e  indwelling lines, tubes, and drains  - Monitor endotracheal if appropriate and nasal secretions for changes in amount and color  - [place] appropriate cooling/warming therapies per order  - Administer medications as ordered  - Instruct and encourage patient and family to use good hand hygiene technique  - Identify and instruct in appropriate isolation precautions for identified infection/condition  Outcome: Progressing  Goal: Absence of fever/infection during neutropenic period  Description: INTERVENTIONS:  - Monitor WBC    Outcome: Progressing     Problem: SAFETY ADULT  Goal: Patient will remain free of falls  Description: INTERVENTIONS:  - Educate patient/family on patient safety including physical limitations  - Instruct patient to call for assistance with activity   - Consult OT/PT to assist with strengthening/mobility   - Keep Call bell within reach  - Keep bed low and locked with side rails adjusted as appropriate  - Keep care items and personal belongings within reach  - Initiate and maintain comfort rounds  - Make Fall Risk Sign visible to staff  - Offer Toileting every 2 Hours, in advance of need  - Apply yellow socks and bracelet for high fall risk patients  - Consider moving patient to room near nurses station  Outcome: Progressing  Goal: Maintain or return to baseline ADL function  Description: INTERVENTIONS:  -  Assess patient's ability to carry out ADLs; assess patient's baseline for ADL function and identify physical deficits which impact ability to perform ADLs (bathing, care of mouth/teeth, toileting, grooming, dressing, etc )  - Assess/evaluate cause of self-care deficits   - Assess range of motion  - Assess patient's mobility; develop plan if impaired  - Assess patient's need for assistive devices and provide as appropriate  - Encourage maximum independence but intervene and supervise when necessary  - Involve family in performance of ADLs  - Assess for home care needs following discharge   - Consider OT consult to assist with ADL evaluation and planning for discharge  - Provide patient education as appropriate  Outcome: Progressing  Goal: Maintains/Returns to pre admission functional level  Description: INTERVENTIONS:  - Perform BMAT or MOVE assessment daily    - Set and communicate daily mobility goal to care team and patient/family/caregiver     - Collaborate with rehabilitation services on mobility goals if consulted  - Dangle patient  - Stand patient   - Ambulate patient   - Out of bed to chair   - Out of bed for meals   - Out of bed for toileting  - Record patient progress and toleration of activity level   Outcome: Progressing     Problem: DISCHARGE PLANNING  Goal: Discharge to home or other facility with appropriate resources  Description: INTERVENTIONS:  - Identify barriers to discharge w/patient and caregiver  - Arrange for needed discharge resources and transportation as appropriate  - Identify discharge learning needs (meds, wound care, etc )  - Arrange for interpretive services to assist at discharge as needed  - Refer to Case Management Department for coordinating discharge planning if the patient needs post-hospital services based on physician/advanced practitioner order or complex needs related to functional status, cognitive ability, or social support system  Outcome: Progressing     Problem: Knowledge Deficit  Goal: Patient/family/caregiver demonstrates understanding of disease process, treatment plan, medications, and discharge instructions  Description: Complete learning assessment and assess knowledge base    Interventions:  - Provide teaching at level of understanding  - Provide teaching via preferred learning methods  Outcome: Progressing     Problem: CARDIOVASCULAR - ADULT  Goal: Maintains optimal cardiac output and hemodynamic stability  Description: INTERVENTIONS:  - Monitor I/O, vital signs and rhythm  - Monitor for S/S and trends of decreased cardiac output  - Administer and titrate ordered vasoactive medications to optimize hemodynamic stability  - Assess quality of pulses, skin color and temperature  - Assess for signs of decreased coronary artery perfusion  - Instruct patient to report change in severity of symptoms  Outcome: Progressing  Goal: Absence of cardiac dysrhythmias or at baseline rhythm  Description: INTERVENTIONS:  - Continuous cardiac monitoring, vital signs, obtain 12 lead EKG if ordered  - Administer antiarrhythmic and heart rate control medications as ordered  - Monitor electrolytes and administer replacement therapy as ordered  Outcome: Progressing

## 2023-04-04 NOTE — ASSESSMENT & PLAN NOTE
Lab Results   Component Value Date    CHOLESTEROL 209 (H) 04/03/2023    TRIG 223 (H) 04/03/2023    HDL 45 04/03/2023    LDLCALC 119 (H) 04/03/2023     · Atorvastatin 40 qd for now   · ASCVD risk 7 4% so would recommended at least a mod intensity statin on dc  · Pt told about side effects, he is amenable to continuation of statin on dc  · Will need LFTs in 4-6 weeks on dc

## 2023-04-04 NOTE — ASSESSMENT & PLAN NOTE
· 2/2 to cough has B/L chest pain, may be allergies vs postnasal gtt vs GERD   · Start pantoprazole 40 daily  · Flonase twice daily  · Continue allergy medications  · Robaxin 10 mg 3 times daily scheduled  · Tessalon Perles 3 times daily  · Hycodan as needed

## 2023-04-04 NOTE — ASSESSMENT & PLAN NOTE
Per Allergist: [place] and [place] tree allergy   Continue Flovent, albuterol q4 hrs, for March season during flairs which he is currently undergoing

## 2023-04-04 NOTE — ASSESSMENT & PLAN NOTE
· POA: Leukocytosis 12, heart rate greater than 90, procalcitonin 0 29 possible right-sided consolidations due to viral pneumonia vs  Aspiration vs  bacterial PNA  · Repeat procalcitonin, if improving with improved cough may consider discontinuation of ceftriaxone

## 2023-04-04 NOTE — ED PROVIDER NOTES
History  Chief Complaint   Patient presents with   • Chest Pain     Pt reports he has acid reflux and aspirated on it on Friday, pt has had chest tightness and shortness of breath since  Pt states he went to urgent care earlier and was told his EKG was abnormal and told to come in to be evaluated  Patient is a 81-year-old male with a history of asthma that presents emerged department with proving dull aching nonradiating left-sided chest pain for 3 days  Patient denies associated symptomatology  Patient states that on Friday he had bilateral upper chest pain that localized to the left side of his chest   Patient states that he thought it was acid reflux and he had taken an antacid which left-sided chest pain symptoms had went away at that time  Patient went to urgent care this afternoon further evaluation of left-sided chest pain symptoms and was recommended that he come to emergency department for further evaluation secondary to changes in ECG  Patient denies chest pain at this time  Patient denies left-sided chest pain abatement with rest   Patient denies palliative factors and provocative factors  Patient denies noneffective treatment  Patient denies fevers, chills, nausea, vomiting, diarrhea, constipation, and urinary symptoms  Patient has recent fall recent trauma  Patient denies sick contacts recent travel  Patient denies shortness of breath and abdominal pain  History provided by:  Patient   used: No    Chest Pain  Associated symptoms: no abdominal pain, no back pain, no cough, no dizziness, no fever, no headache, no nausea, no numbness, no palpitations, no shortness of breath, not vomiting and no weakness        Prior to Admission Medications   Prescriptions Last Dose Informant Patient Reported? Taking?    Ascorbic Acid (VITAMIN C) 100 MG tablet 4/2/2023  Yes Yes   Sig: Take 100 mg by mouth daily   Multiple Vitamin (MULTIVITAMIN) tablet 4/2/2023  Yes Yes   Sig: Take 1 tablet by mouth daily   albuterol (PROVENTIL HFA,VENTOLIN HFA) 90 mcg/act inhaler 4/3/2023  No Yes   Sig: Inhale 2 puffs every 6 (six) hours as needed for wheezing or shortness of breath   Patient taking differently: Inhale 2 puffs as needed for wheezing or shortness of breath   aspirin (ECOTRIN LOW STRENGTH) 81 mg EC tablet 4/2/2023  Yes Yes   Sig: Take 162 mg by mouth daily   clobetasol (TEMOVATE) 0 05 % external solution More than a month  Yes No   Sig: apply to scalp twice a day   fluticasone (FLOVENT HFA) 110 MCG/ACT inhaler   Yes Yes   Sig: Inhale 2 puffs 2 (two) times a day Rinse mouth after use    montelukast (SINGULAIR) 10 mg tablet 4/2/2023  No Yes   Sig: Take 1 tablet (10 mg total) by mouth daily at bedtime      Facility-Administered Medications: None       Past Medical History:   Diagnosis Date   • Allergic    • Left knee pain    • Prediabetes    • Seasonal allergies    • Wears glasses        Past Surgical History:   Procedure Laterality Date   • COLONOSCOPY     • MA ARTHRT W/EXC SEMILUNAR CRTLG KNEE MEDIAL/LAT Left 1/14/2020    Procedure: KNEE ARTHROSCOPIC MENISCECTOMY MEDIAL; PARTIAL SYNOVECTOMY;  Surgeon: Rip Sanchez DO;  Location: [facility];  Service: Orthopedics       Family History   Problem Relation Age of Onset   • No Known Problems Mother      I have reviewed and agree with the history as documented  E-Cigarette/Vaping     E-Cigarette/Vaping Substances     Social History     Tobacco Use   • Smoking status: Never   • Smokeless tobacco: Never   Substance Use Topics   • Alcohol use: No   • Drug use: No       Review of Systems   Constitutional: Negative for activity change, appetite change, chills and fever  HENT: Negative for congestion, postnasal drip, rhinorrhea, sinus pressure, sinus pain, sore throat and tinnitus  Eyes: Negative for photophobia and visual disturbance  Respiratory: Negative for cough, chest tightness and shortness of breath      Cardiovascular: Positive for chest pain  Negative for palpitations  Gastrointestinal: Negative for abdominal pain, constipation, diarrhea, nausea and vomiting  Genitourinary: Negative for difficulty urinating, dysuria, flank pain, frequency and urgency  Musculoskeletal: Negative for back pain, gait problem, neck pain and neck stiffness  Skin: Negative for pallor and rash  Allergic/Immunologic: Negative for environmental allergies and food allergies  Neurological: Negative for dizziness, weakness, numbness and headaches  Psychiatric/Behavioral: Negative for confusion  All other systems reviewed and are negative  Physical Exam  Physical Exam  Vitals and nursing note reviewed  Constitutional:       General: He is awake  Appearance: Normal appearance  He is well-developed  He is not ill-appearing, toxic-appearing or diaphoretic  Comments: /78 (BP Location: Right arm)   Pulse 87   Temp 98 2 °F (36 8 °C) (Oral)   Resp 18   SpO2 94%      HENT:      Head: Normocephalic and atraumatic  Right Ear: Hearing and external ear normal  No decreased hearing noted  No drainage, swelling or tenderness  No mastoid tenderness  Left Ear: Hearing and external ear normal  No decreased hearing noted  No drainage, swelling or tenderness  No mastoid tenderness  Nose: Nose normal       Mouth/Throat:      Lips: Pink  Mouth: Mucous membranes are moist       Pharynx: Oropharynx is clear  Uvula midline  Eyes:      General: Lids are normal  Vision grossly intact  Right eye: No discharge  Left eye: No discharge  Extraocular Movements: Extraocular movements intact  Conjunctiva/sclera: Conjunctivae normal       Pupils: Pupils are equal, round, and reactive to light  Neck:      Vascular: No JVD  Trachea: Trachea and phonation normal  No tracheal tenderness or tracheal deviation  Cardiovascular:      Rate and Rhythm: Normal rate and regular rhythm  Pulses: Normal pulses  Radial pulses are 2+ on the right side and 2+ on the left side  Posterior tibial pulses are 2+ on the right side and 2+ on the left side  Heart sounds: Normal heart sounds  Pulmonary:      Effort: Pulmonary effort is normal       Breath sounds: Normal breath sounds  No stridor  No decreased breath sounds, wheezing, rhonchi or rales  Chest:      Chest wall: No tenderness  Abdominal:      General: Abdomen is flat  Bowel sounds are normal  There is no distension  Palpations: Abdomen is soft  Abdomen is not rigid  Tenderness: There is no abdominal tenderness  There is no guarding or rebound  Musculoskeletal:         General: Normal range of motion  Cervical back: Full passive range of motion without pain, normal range of motion and neck supple  No rigidity  No spinous process tenderness or muscular tenderness  Normal range of motion  Lymphadenopathy:      Head:      Right side of head: No submental, submandibular, tonsillar, preauricular, posterior auricular or occipital adenopathy  Left side of head: No submental, submandibular, tonsillar, preauricular, posterior auricular or occipital adenopathy  Cervical: No cervical adenopathy  Right cervical: No superficial, deep or posterior cervical adenopathy  Left cervical: No superficial, deep or posterior cervical adenopathy  Skin:     General: Skin is warm  Capillary Refill: Capillary refill takes less than 2 seconds  Neurological:      General: No focal deficit present  Mental Status: He is alert and oriented to person, place, and time  GCS: GCS eye subscore is 4  GCS verbal subscore is 5  GCS motor subscore is 6  Sensory: No sensory deficit  Deep Tendon Reflexes: Reflexes are normal and symmetric  Reflex Scores:       Patellar reflexes are 2+ on the right side and 2+ on the left side    Psychiatric:         Mood and Affect: Mood normal          Speech: Speech normal          Behavior: Behavior normal  Behavior is cooperative  Thought Content:  Thought content normal          Judgment: Judgment normal          Vital Signs  ED Triage Vitals   Temperature Pulse Respirations Blood Pressure SpO2   04/03/23 1929 04/03/23 1929 04/03/23 1929 04/03/23 1929 04/03/23 1929   98 2 °F (36 8 °C) 98 18 161/78 95 %      Temp Source Heart Rate Source Patient Position - Orthostatic VS BP Location FiO2 (%)   04/03/23 1929 04/03/23 1929 04/03/23 1929 04/03/23 1929 --   Oral Monitor Sitting Right arm       Pain Score       04/03/23 2141       2           Vitals:    04/03/23 1929 04/03/23 1945 04/03/23 2158 04/04/23 0209   BP: 161/78  132/61 118/63   Pulse: 98 87 81 65   Patient Position - Orthostatic VS: Sitting  Sitting Lying         Visual Acuity      ED Medications  Medications   atorvastatin (LIPITOR) tablet 40 mg (40 mg Oral Given 4/4/23 0027)   aspirin chewable tablet 81 mg (has no administration in time range)   acetaminophen (TYLENOL) tablet 650 mg (has no administration in time range)   albuterol (PROVENTIL HFA,VENTOLIN HFA) inhaler 2 puff (has no administration in time range)   aspirin (ECOTRIN LOW STRENGTH) EC tablet 162 mg (has no administration in time range)   fluticasone (FLOVENT HFA) 110 MCG/ACT inhaler 2 puff (has no administration in time range)   montelukast (SINGULAIR) tablet 10 mg (10 mg Oral Refused 4/4/23 0147)   ceftriaxone (ROCEPHIN) 1 g/50 mL in dextrose IVPB (1,000 mg Intravenous New Bag 4/4/23 0027)   benzonatate (TESSALON PERLES) capsule 200 mg (200 mg Oral Given 4/4/23 0027)   pantoprazole (PROTONIX) EC tablet 40 mg (has no administration in time range)   methocarbamol (ROBAXIN) tablet 500 mg (500 mg Oral Given 4/4/23 0027)   glycerin-hypromellose- (ARTIFICIAL TEARS) ophthalmic solution 1 drop (has no administration in time range)   HYDROcodone Bit-Homatrop MBr (HYCODAN) oral syrup 5 mL (5 mL Oral Given 4/4/23 0217)   fluticasone (FLONASE) 50 mcg/act nasal spray 2 spray (2 sprays Each Nare Given 4/4/23 0027)   guaiFENesin (MUCINEX) 12 hr tablet 600 mg (600 mg Oral Given 4/3/23 2102)   aspirin tablet 325 mg (325 mg Oral Given 4/3/23 2102)       Diagnostic Studies  Results Reviewed     Procedure Component Value Units Date/Time    HS Troponin I 4hr [[id number]]  (Normal) Collected: 04/04/23 0059    Lab Status: Final result Specimen: Blood from Arm, Right Updated: 04/04/23 0206     hs TnI 4hr 7 ng/L      Delta 4hr hsTnI -2 ng/L     Procalcitonin [[id number]]  (Abnormal) Collected: 04/03/23 1952    Lab Status: Final result Specimen: Blood from Arm, Left Updated: 04/03/23 2255     Procalcitonin 0 29 ng/ml     HS Troponin I 2hr [[id number]]  (Normal) Collected: 04/03/23 2150    Lab Status: Final result Specimen: Blood from Line, Venous Updated: 04/03/23 2231     hs TnI 2hr 9 ng/L      Delta 2hr hsTnI 0 ng/L     B-Type Natriuretic Peptide(BNP) [[id number]]  (Abnormal) Collected: 04/03/23 1952    Lab Status: Final result Specimen: Blood from Arm, Left Updated: 04/03/23 2129      pg/mL     [place] draw [[id number]] Collected: 04/03/23 1952    Lab Status: Final result Specimen: Blood from Arm, Left Updated: 04/03/23 2101    Narrative: The following orders were created for panel order [place] draw  Procedure                               Abnormality         Status                     ---------                               -----------         ------                     Jo Catching Top on CKQM[[id number]]                           Final result                 Please view results for these tests on the individual orders  Hemoglobin A1C [[id number]] Collected: 04/03/23 1952    Lab Status:  In process Specimen: Blood from Arm, Left Updated: 04/03/23 2055    Lipid Panel with Direct LDL reflex [[id number]]  (Abnormal) Collected: 04/03/23 1952    Lab Status: Final result Specimen: Blood from Arm, Left Updated: 04/03/23 2041     Cholesterol 209 mg/dL      Triglycerides 223 mg/dL      HDL, Direct 45 mg/dL      LDL Calculated 119 mg/dL     HS Troponin 0hr (reflex protocol) [[id number]]  (Normal) Collected: 04/03/23 1952    Lab Status: Final result Specimen: Blood from Arm, Left Updated: 04/03/23 2034     hs TnI 0hr 9 ng/L     Comprehensive metabolic panel [[id number]] Collected: 04/03/23 1952    Lab Status: Final result Specimen: Blood from Arm, Left Updated: 04/03/23 2029     Sodium 138 mmol/L      Potassium 4 2 mmol/L      Chloride 105 mmol/L      CO2 23 mmol/L      ANION GAP 10 mmol/L      BUN 13 mg/dL      Creatinine 0 87 mg/dL      Glucose 110 mg/dL      Calcium 9 3 mg/dL      AST 15 U/L      ALT 16 U/L      Alkaline Phosphatase 58 U/L      Total Protein 7 0 g/dL      Albumin 3 9 g/dL      Total Bilirubin 0 71 mg/dL      eGFR 96 ml/min/1 73sq m     Narrative:      [place] guidelines for Chronic Kidney Disease (CKD):   •  Stage 1 with normal or high GFR (GFR > 90 mL/min/1 73 square meters)  •  Stage 2 Mild CKD (GFR = 60-89 mL/min/1 73 square meters)  •  Stage 3A Moderate CKD (GFR = 45-59 mL/min/1 73 square meters)  •  Stage 3B Moderate CKD (GFR = 30-44 mL/min/1 73 square meters)  •  Stage 4 Severe CKD (GFR = 15-29 mL/min/1 73 square meters)  •  Stage 5 End Stage CKD (GFR <15 mL/min/1 73 square meters)  Note: GFR calculation is accurate only with a steady state creatinine    CBC and differential [[id number]]  (Abnormal) Collected: 04/03/23 1952    Lab Status: Final result Specimen: Blood from Arm, Left Updated: 04/03/23 1959     WBC 12 12 Thousand/uL      RBC 4 85 Million/uL      Hemoglobin 13 2 g/dL      Hematocrit 42 1 %      MCV 87 fL      MCH 27 2 pg      MCHC 31 4 g/dL      RDW 14 1 %      MPV 10 8 fL      Platelets 569 Thousands/uL      nRBC 0 /100 WBCs      Neutrophils Relative 78 %      Immat GRANS % 1 %      Lymphocytes Relative 10 %      Monocytes Relative 7 %      Eosinophils Relative 3 %      Basophils Relative 1 %      Neutrophils Absolute 9 60 Thousands/µL      Immature Grans Absolute 0 06 Thousand/uL      Lymphocytes Absolute 1 20 Thousands/µL      Monocytes Absolute 0 89 Thousand/µL      Eosinophils Absolute 0 31 Thousand/µL      Basophils Absolute 0 06 Thousands/µL                  XR chest 1 view portable   ED Interpretation by Chuyita Frazier PA-C (04/03 2056)   Consolidations right upper middle and lower lobe consolidations of right upper middle and lower lobe                 Procedures  ECG 12 Lead Documentation Only    Date/Time: 4/3/2023 7:30 PM  Performed by: Chuyita Frazier PA-C  Authorized by: Chuyita Frazier PA-C     Indications / Diagnosis:  Chest pain  ECG reviewed by me, the ED Provider: yes    Patient location:  ED  Previous ECG:     Previous ECG:  Compared to current    Comparison ECG info:  Compared with ECG on April 25, 2018, T wave inversions now present in V2, V3, III, and avf, and T wave flattening in V4, V5, and V6    Similarity:  Changes noted    Comparison to cardiac monitor: Yes    Interpretation:     Interpretation: non-specific    Rate:     ECG rate:  89    ECG rate assessment: normal    Rhythm:     Rhythm: sinus rhythm    Ectopy:     Ectopy: none    QRS:     QRS axis:  Left    QRS intervals:  Normal  Conduction:     Conduction: normal    ST segments:     ST segments:  Normal  T waves:     T waves: flattening and inverted      Inverted:  V2, V3, III and aVF  Q waves:     Q waves:  V4, V5 and V6  Comments:      RBBB  ECG 12 Lead Documentation Only    Date/Time: 4/3/2023 9:58 PM  Performed by: Chuyita Frazier PA-C  Authorized by: Chuyita Frazier PA-C     Indications / Diagnosis:  Chest pain repeat  ECG reviewed by me, the ED Provider: yes    Patient location:  ED  Previous ECG:     Previous ECG:  Compared to current    Comparison ECG info:   When compared to ECG on April 3, 2023 no significant changes were noted    Similarity:  No change    Comparison to cardiac monitor: Yes    Interpretation:     Interpretation: non-specific    Rate:     ECG rate:  85    ECG rate assessment: normal    Rhythm:     Rhythm: sinus rhythm    Ectopy:     Ectopy: none    QRS:     QRS axis:  Left    QRS intervals:  Normal  Conduction:     Conduction: normal    ST segments:     ST segments:  Normal  T waves:     T waves: flattening and inverted      Flattening:  V4, V5 and V6    Inverted:  III, aVF, V2 and V3             ED Course  ED Course as of 04/04/23 0232   Mon Apr 03, 2023 2203 Tiger text to slim               HEART Risk Score    Flowsheet Row Most Recent Value   Heart Score Risk Calculator    History 2 Filed at: 04/03/2023 2104   ECG 1 Filed at: 04/03/2023 2104   Age 1 Filed at: 04/03/2023 2104   Risk Factors 2 Filed at: 04/03/2023 2104   Troponin 0 Filed at: 04/03/2023 2104   HEART Score 6 Filed at: 04/03/2023 2104                          EMELINA Risk Score    Flowsheet Row Most Recent Value   Age >= 72 1 Filed at: 04/03/2023 2300   Known CAD (stenosis >= 50%) 0 Filed at: 04/03/2023 2300   Recent (<=24 hrs) Service Angina 1 Filed at: 04/03/2023 2300   ST Deviation >= 0 5 mm 0 Filed at: 04/03/2023 2300   3+ CAD Risk Factors (FHx, HTN, HLP, DM, Smoker) 0 Filed at: 04/03/2023 2300   Aspirin Use Past 7 Days 1 Filed at: 04/03/2023 2300   Elevated Cardiac Markers 0 Filed at: 04/03/2023 2300   EMELINA Risk Score (Calculated) 3 Filed at: 04/03/2023 2300        Wells' Criteria for PE    Flowsheet Row Most Recent Value   Wells' Criteria for PE    Clinical signs and symptoms of DVT 0 Filed at: 04/03/2023 2022   PE is primary diagnosis or equally likely 0 Filed at: 04/03/2023 2022   HR >100 0 Filed at: 04/03/2023 2022   Immobilization at least 3 days or Surgery in the previous 4 weeks 0 Filed at: 04/03/2023 2022   Previous, objectively diagnosed PE or DVT 0 Filed at: 04/03/2023 2022   Hemoptysis 0 Filed at: 04/03/2023 2022   Malignancy with treatment within 6 months or palliative 0 Filed at: 04/03/2023 2022   Dasha Alvarenga' Criteria Total 0 Filed at: 04/03/2023 2022        Dasha Alvarenga' Criteria for DVT    Flowsheet Row Most Recent Value   Jose G' Criteria for DVT    Active cancer Treatment or palliation within 6 months 0 Filed at: 04/03/2023 2022   Bedridden recently >3 days or major surgery within 12 weeks 0 Filed at: 04/03/2023 2022   Calf swelling >3 cm compared to the other leg 0 Filed at: 04/03/2023 2022   Entire leg swollen 0 Filed at: 04/03/2023 2022   Collateral (nonvaricose) superficial veins present 0 Filed at: 04/03/2023 2022   Localized tenderness along the deep venous system 0 Filed at: 04/03/2023 2022   Pitting edema, confined to symptomatic leg 0 Filed at: 04/03/2023 2022   Paralysis, paresis, or recent plaster immobilization of the lower extremity 0 Filed at: 04/03/2023 2022   Previously documented DVT 0 Filed at: 04/03/2023 2022   Alternative diagnosis to DVT as likely or more likely 0 Filed at: 04/03/2023 2022   Jose G DVT Critera Score 0 Filed at: 04/03/2023 2022              Medical Decision Making  Patient is a 20-year-old male with a history of asthma that presents emerged department with proving dull aching nonradiating left-sided chest pain for 3 days  Patient denies associated symptomatology    Patient states that on Friday he had bilateral upper chest pain that localized to the left side of his chest    Hemodynamic stable and afebrile  Heart score 6  Negative Wells, doubt pulmonary embolism  Chest x-ray with consolidations throughout right lung parenchyma; procalcitonin in process; slight leukocytosis, patient afebrile  ECG with changes from April 25, 2018 now with T wave inversions present in V2, V3, III and aVF, T wave flattening in V4 through V5 and 6  Hypertriglyceridemia, hypercholesteremia  Patient denies chest pain throughout the entire ED visit  Aspirin, Mucinex  will wait for procalcitonin results and [place]ard to treatment with antibiotics  Discussed patient case with general medicine resident and both agreed to place patient on inpatient status under the care of Dr Charlette Ortiz, internal medicine  Patient with verbal understanding of all clinical laboratory and imaging findings, admission instructions and verbalized agreement with patient current treatment plan  Amount and/or Complexity of Data Reviewed  Labs: ordered  Radiology: ordered and independent interpretation performed  Decision-making details documented in ED Course  ECG/medicine tests:  Decision-making details documented in ED Course  Risk  OTC drugs  Decision regarding hospitalization  Disposition  Final diagnoses:   Chest pain   Hypercholesteremia     Time reflects when diagnosis was documented in both MDM as applicable and the Disposition within this note     Time User Action Codes Description Comment    4/3/2023 10:14 PM Imagene Stuart Add [R07 [id number]] Chest pain     4/3/2023 10:14 PM Imagene Stuart Add [E78 [id number]] Hypercholesteremia       ED Disposition     ED Disposition   Admit    Condition   Stable    Date/Time   Mon Apr 3, 2023 10:22 PM    Comment   Case was discussed SL[facility] resident and the patient's admission status was agreed to be Admission Status: observation status to the [facility] Internal Medicine             Follow-up Information    None         Current Discharge Medication List      CONTINUE these medications which have NOT CHANGED    Details   albuterol (PROVENTIL HFA,VENTOLIN HFA) 90 mcg/act inhaler Inhale 2 puffs every 6 (six) hours as needed for wheezing or shortness of breath  Qty: 1 Inhaler, Refills: 0    Associated Diagnoses: Acute recurrent maxillary sinusitis      Ascorbic Acid (VITAMIN C) 100 MG tablet Take 100 mg by mouth daily      aspirin (ECOTRIN LOW STRENGTH) 81 mg EC tablet Take 162 mg by mouth daily      fluticasone (FLOVENT HFA) 110 MCG/ACT inhaler Inhale 2 puffs 2 (two) times a day Rinse mouth after use       montelukast (SINGULAIR) 10 mg tablet Take 1 tablet (10 mg total) by mouth daily at bedtime  Qty: 30 tablet, Refills: 2    Associated Diagnoses: Seasonal allergic rhinitis due to pollen      Multiple Vitamin (MULTIVITAMIN) tablet Take 1 tablet by mouth daily      clobetasol (TEMOVATE) 0 05 % external solution apply to scalp twice a day             No discharge procedures on file      PDMP Review       Value Time User    PDMP Reviewed  Yes 7/15/2021  7:57 AM Dana Carroll PA-C          ED Provider  Electronically Signed by           Alice Stephens PA-C  04/04/23 [id number]

## 2023-04-05 LAB
CHEST PAIN STATEMENT: NORMAL
CHEST PAIN STATEMENT: NORMAL
MAX DIASTOLIC BP: 80 MMHG
MAX DIASTOLIC BP: 80 MMHG
MAX HEART RATE: 137 BPM
MAX HEART RATE: 137 BPM
MAX PREDICTED HEART RATE: 164 BPM
MAX PREDICTED HEART RATE: 164 BPM
MAX. SYSTOLIC BP: 198 MMHG
MAX. SYSTOLIC BP: 198 MMHG
PROTOCOL NAME: NORMAL
PROTOCOL NAME: NORMAL
REASON FOR TERMINATION: NORMAL
REASON FOR TERMINATION: NORMAL
TARGET HR FORMULA: NORMAL
TARGET HR FORMULA: NORMAL
TEST INDICATION: NORMAL
TEST INDICATION: NORMAL
TIME IN EXERCISE PHASE: NORMAL
TIME IN EXERCISE PHASE: NORMAL

## 2023-04-07 LAB
ATRIAL RATE: 68 BPM
ATRIAL RATE: 85 BPM
ATRIAL RATE: 89 BPM
P AXIS: 35 DEGREES
P AXIS: 37 DEGREES
P AXIS: 38 DEGREES
PR INTERVAL: 160 MS
PR INTERVAL: 164 MS
PR INTERVAL: 168 MS
QRS AXIS: 11 DEGREES
QRS AXIS: 14 DEGREES
QRS AXIS: 14 DEGREES
QRSD INTERVAL: 132 MS
QT INTERVAL: 358 MS
QT INTERVAL: 384 MS
QT INTERVAL: 410 MS
QTC INTERVAL: 435 MS
QTC INTERVAL: 435 MS
QTC INTERVAL: 456 MS
T WAVE AXIS: -15 DEGREES
T WAVE AXIS: -17 DEGREES
T WAVE AXIS: -23 DEGREES
VENTRICULAR RATE: 68 BPM
VENTRICULAR RATE: 85 BPM
VENTRICULAR RATE: 89 BPM

## 2024-09-11 ENCOUNTER — OFFICE VISIT (OUTPATIENT)
Dept: CARDIOLOGY CLINIC | Facility: CLINIC | Age: 59
End: 2024-09-11
Payer: COMMERCIAL

## 2024-09-11 VITALS
OXYGEN SATURATION: 97 % | BODY MASS INDEX: 45.1 KG/M2 | DIASTOLIC BLOOD PRESSURE: 86 MMHG | SYSTOLIC BLOOD PRESSURE: 152 MMHG | HEIGHT: 70 IN | WEIGHT: 315 LBS | TEMPERATURE: 97.6 F | HEART RATE: 62 BPM

## 2024-09-11 DIAGNOSIS — R07.9 CHEST PAIN, UNSPECIFIED TYPE: ICD-10-CM

## 2024-09-11 DIAGNOSIS — R94.31 ABNORMAL EKG: Primary | ICD-10-CM

## 2024-09-11 PROCEDURE — 93000 ELECTROCARDIOGRAM COMPLETE: CPT | Performed by: INTERNAL MEDICINE

## 2024-09-11 PROCEDURE — 99204 OFFICE O/P NEW MOD 45 MIN: CPT | Performed by: INTERNAL MEDICINE

## 2024-09-11 NOTE — PROGRESS NOTES
Cardiology Consultation     Agustin Renner  [id number]  1965  [facility]  [facility] [place] [id number] [facility] [id number] [phone]      1. Abnormal EKG  POCT ECG    NM myocardial perfusion spect (rx stress and/or rest)      2. Chest pain, unspecified type  NM myocardial perfusion spect (rx stress and/or rest)          Discussion/Summary:    Patient with cardiac risk factors of obesity, dyslipidemia which is currently being monitored. Impaired fasting glucose. He had exertional symptoms of some chest discomfort and had a exercise stress echo which did not reach target heart rate. He attempted this on 2 separate occasions, but was still submaximal heart rate. Nondiagnostic test.    I am going to order a pharmacologic nuclear stress test to evaluate for CAD.    We reviewed his lipid panel. He is going to work on lifestyle as long as the stress test is okay and if there are abnormalities on the stress, would start him on a statin.    He is prediabetic and is now on metformin. Discussed risk factor for coronary disease and this does play into her decision about lipid-lowering therapy, as well.      History of Present Illness:  59-year-old man comes to the office today for evaluation of symptoms he has been experiencing questioning whether this is cardiac.    He has a history of elevated glucose recently started on metformin.    Patient reports that in April 2023, he went to urgent care with some symptoms of chest discomfort. He was found to have what looks like a right bundle branch block on ECG and was referred to the emergency room where he had testing with an echocardiogram and an exercise treadmill test which were unremarkable.    For about 6 months now, he is experienced the symptoms that he has a somewhat hard time describing but described it as a tingling but then more a discomfort it was occurring when he was sometimes  walking from his car to the house. He had evaluation with stress echo on 2 occasions because the first time he did not reach his target heart rate but he felt he could go for longer a second time but still did not reach his target heart rate. At a submaximal exercise capacity, his ECG and echo were unremarkable.    He has elevated cholesterol, but he is discussed with his primary care physician about changing his lifestyle and reassessing that in the future instead of starting medication of the bat.    There was discussion about getting a pharmacologic nuclear stress test, but then he decided to hold off and have a consultation before getting the test through his primary care physician.    Patient Active Problem List   Diagnosis    Left knee pain    Tear of medial meniscus of left knee, current    Status post arthroscopic partial medial meniscectomy    Arthritis of left knee    Trauma to left eye    Chest wall pain    Hyperlipidemia    Abnormal EKG    SIRS (systemic inflammatory response syndrome) (HCC)    Systolic murmur    Allergies     Past Medical History:   Diagnosis Date    Allergic     Left knee pain     Prediabetes     Seasonal allergies     Wears glasses      Social History     Tobacco Use    Smoking status: Never    Smokeless tobacco: Never   Substance Use Topics    Alcohol use: No    Drug use: No      Family History   Problem Relation Age of Onset    No Known Problems Mother      Past Surgical History:   Procedure Laterality Date    COLONOSCOPY      [place] ARTHRT W/EXC SEMILUNAR CRTLG KNEE MEDIAL/LAT Left 1/14/2020    Procedure: KNEE ARTHROSCOPIC MENISCECTOMY MEDIAL; PARTIAL SYNOVECTOMY;  Surgeon: Agustin Patrick DO;  [facility];  Service: Orthopedics       Current Outpatient Medications:     Ascorbic Acid (VITAMIN C) 100 MG tablet, Take 100 mg by mouth daily, Disp: , Rfl:     esomeprazole (NexIUM) 20 mg capsule, Take 20 mg by mouth, Disp: , Rfl:     fluticasone (FLOVENT HFA) 110 MCG/ACT inhaler,  "Inhale 2 puffs 2 (two) times a day Rinse mouth after use., Disp: , Rfl:     metFORMIN (GLUCOPHAGE) 500 mg tablet, Take 500 mg by mouth daily with breakfast, Disp: , Rfl:     montelukast (SINGULAIR) 10 mg tablet, Take 1 tablet (10 mg total) by mouth daily at bedtime, Disp: 30 tablet, Rfl: 2    Multiple Vitamin (MULTIVITAMIN) tablet, Take 1 tablet by mouth daily, Disp: , Rfl:     albuterol (PROVENTIL HFA,VENTOLIN HFA) 90 mcg/act inhaler, Inhale 2 puffs every 6 (six) hours as needed for wheezing or shortness of breath (Patient not taking: Reported on 9/11/2024), Disp: 1 Inhaler, Rfl: 0    aspirin (ECOTRIN LOW STRENGTH) 81 mg EC tablet, Take 162 mg by mouth daily (Patient not taking: Reported on 9/11/2024), Disp: , Rfl:     clobetasol (TEMOVATE) 0.05 % external solution, apply to scalp twice a day (Patient not taking: Reported on 9/11/2024), Disp: , Rfl:   Allergies   Allergen Reactions    Cat Hair Extract Sneezing, Itching and Other (See Comments)     Other reaction(s): Sneezing    Acer Negundo Allergy Skin Test Other (See Comments)       Vitals:    09/11/24 1438   BP: 152/86   BP Location: Left arm   Patient Position: Sitting   Cuff Size: Adult   Pulse: 62   Temp: 97.6 °F (36.4 °C)   TempSrc: Tympanic   SpO2: 97%   Weight: (!) 149 kg (328 lb)   Height: 5' 10\" (1.778 m)     Vitals:    09/11/24 1438   Weight: (!) 149 kg (328 lb)      Height: 5' 10\" (177.8 cm)   Body mass index is 47.06 kg/m².    Physical Exam:  GENERAL: Alert, well appearing, and in no distress  HEENT:  PERRL, EOMI, no scleral icterus, no conjunctival pallor  NECK:  Supple, No elevated JVP, no thyromegaly, no carotid bruits  HEART:  Regular rate and rhythm, normal S1/S2, no S3/S4, no murmur or rub  LUNGS:  Clear to auscultation bilaterally  ABDOMEN:  Soft, non-tender, positive bowel sounds, no rebound or guarding  EXTREMITIES:  No edema  VASCULAR:  Normal pedal pulses   NEURO: No focal deficits,  SKIN: Normal without suspicious lesions on exposed " "skin    ROS:  Positive for tingling in the chest, shortness of breath  Except as noted in HPI, is otherwise reviewed in detail and a 12 point review of systems is negative.    Labs:  Lab Results   Component Value Date    SODIUM 140 07/20/2024    K 5.0 07/20/2024     07/20/2024    CREATININE 0.81 07/20/2024    BUN 19 07/20/2024    CO2 26 07/20/2024    ALT 18 07/20/2024    AST 16 07/20/2024    TSH 1.66 07/20/2024    GLUF 127 (H) 04/04/2023    HGBA1C 6.0 (H) 07/20/2024    WBC 9.30 04/04/2023    HGB 12.4 04/04/2023    HCT 39.2 04/04/2023     04/04/2023     No results found for: \"CHOL\"  Lab Results   Component Value Date    HDL 45 04/03/2023     Lab Results   Component Value Date    LDLCALC 119 (H) 04/03/2023     Lab Results   Component Value Date    TRIG 223 (H) 04/03/2023     Testing:  Stress Echo in Care Everywhere    EKG:  Sinus rhythm, 62 beats per minute  "

## 2024-09-27 ENCOUNTER — HOSPITAL ENCOUNTER (OUTPATIENT)
Dept: NON INVASIVE DIAGNOSTICS | Facility: CLINIC | Age: 59
Discharge: HOME | End: 2024-09-27

## 2024-10-18 ENCOUNTER — HOSPITAL ENCOUNTER (OUTPATIENT)
Dept: NON INVASIVE DIAGNOSTICS | Facility: CLINIC | Age: 59
Discharge: HOME | End: 2024-10-18

## 2024-10-28 ENCOUNTER — TELEPHONE (OUTPATIENT)
Age: 59
End: 2024-10-28

## 2024-10-28 NOTE — TELEPHONE ENCOUNTER
Patient called in to verify we have his ultrasound from 2022. I confirmed we had his records from [facility].

## 2024-10-29 ENCOUNTER — TELEPHONE (OUTPATIENT)
Age: 59
End: 2024-10-29

## 2024-10-29 ENCOUNTER — OFFICE VISIT (OUTPATIENT)
Dept: UROLOGY | Facility: CLINIC | Age: 59
End: 2024-10-29
Payer: COMMERCIAL

## 2024-10-29 VITALS
SYSTOLIC BLOOD PRESSURE: 124 MMHG | DIASTOLIC BLOOD PRESSURE: 82 MMHG | WEIGHT: 315 LBS | HEIGHT: 70 IN | BODY MASS INDEX: 45.1 KG/M2

## 2024-10-29 DIAGNOSIS — N43.0 ENCYSTED HYDROCELE: Primary | ICD-10-CM

## 2024-10-29 PROCEDURE — 99204 OFFICE O/P NEW MOD 45 MIN: CPT | Performed by: UROLOGY

## 2024-10-29 NOTE — TELEPHONE ENCOUNTER
Patient was calling to schedule surgery. He just saw Dr. Lyon today. Advised him that the case had to be created. Then it prolly would get to the OR  til tomorrow or the day after because it is the espinoza of the day.     Once the OR  gets the case, it will be another 5 to 7 business days for them to reach out to the patient.     He states that Dr. Lyon told him to call now. They his surgery schedule is out until Racheal time. Patient would just like it to get scheduled so he can work out travel for the holiday season

## 2024-10-29 NOTE — PROGRESS NOTES
Referring Physician: Jermaine Shea DO (Inactive)  A copy of this note was sent to the referring physician.       Diagnoses and all orders for this visit:    Encysted hydrocele  -     Case request operating room: HYDROCELECTOMY; Standing  -     Case request operating room: HYDROCELECTOMY  -     US scrotum and testicles; Future    Other orders  -     Place sequential compression device; Standing  -     ceFAZolin (ANCEF) 3,000 mg in sodium chloride 0.9 % 50 mL IVPB            Assessment and plan:       1 left hydrocele versus spermatocele    #2 urinary frequency  - Patient counseled to cut down on iced tea and drink more water  - We will reassess this at his hydrocelectomy follow-up and consider medical management    Today we had a discussion on the multifactorial etiology of hydroceles and the standardized treatment approaches. I discussed aspiration as well as formal hydrocelectomy. I recommended t that he have a hydrocelectomy as the hydrocele often recurs very quickly after aspiration. I discussed the risks of the procedure including, but not limited to scrotal hematoma, testicular pain, testicular injury, infection, postoperative pain, and possible loss of the testicle. After his scrotal ultrasound, we will proceed with a hydrocelectomy.    Juno Lyon MD      Chief Complaint     Hydrocele consult      History of Present Illness     Agustin Renner is a 59 y.o. referred in consultation for hydrocele    Detailed Urologic History     - please refer to HPI    Review of Systems     Review of Systems   Constitutional: Negative for activity change and fatigue.   HENT: Negative for congestion.    Eyes: Negative for visual disturbance.   Respiratory: Negative for shortness of breath and wheezing.    Cardiovascular: Negative for chest pain and leg swelling.   Gastrointestinal: Negative for abdominal pain.   Endocrine: Negative for polyuria.   Genitourinary: Negative for dysuria, flank pain, hematuria and  "urgency.   Musculoskeletal: Negative for back pain.   Allergic/Immunologic: Negative for immunocompromised state.   Neurological: Negative for dizziness and numbness.   Psychiatric/Behavioral: Negative for dysphoric mood.   All other systems reviewed and are negative.          Allergies     Allergies   Allergen Reactions    Cat Hair Extract Sneezing, Itching and Other (See Comments)     Other reaction(s): Sneezing    Acer Negundo Allergy Skin Test Other (See Comments)       Physical Exam       Physical Exam  Constitutional:       General: He is not in acute distress.     Appearance: He is well-developed.   HENT:      Head: Normocephalic and atraumatic.   Cardiovascular:      Comments: Negative lower extremity edema  Pulmonary:      Effort: Pulmonary effort is normal.      Breath sounds: Normal breath sounds.   Abdominal:      Palpations: Abdomen is soft.   Musculoskeletal:         General: Normal range of motion.      Cervical back: Normal range of motion.   Skin:     General: Skin is warm.   Neurological:      Mental Status: He is alert and oriented to person, place, and time.   Psychiatric:         Behavior: Behavior normal.     Obese abdomen  Approximately 6 to 7 cm left fluid collection consistent with a hydrocele or potentially a spermatocele      Vital Signs  Vitals:    10/29/24 1551   BP: 124/82   BP Location: Left arm   Patient Position: Sitting   Cuff Size: Adult   Weight: (!) 143 kg (315 lb)   Height: 5' 10\" (1.778 m)         Current Medications       Current Outpatient Medications:     albuterol (PROVENTIL HFA,VENTOLIN HFA) 90 mcg/act inhaler, Inhale 2 puffs every 6 (six) hours as needed for wheezing or shortness of breath, Disp: 1 Inhaler, Rfl: 0    Ascorbic Acid (VITAMIN C) 100 MG tablet, Take 100 mg by mouth daily, Disp: , Rfl:     aspirin (ECOTRIN LOW STRENGTH) 81 mg EC tablet, Take 162 mg by mouth daily, Disp: , Rfl:     clobetasol (TEMOVATE) 0.05 % external solution, , Disp: , Rfl:     esomeprazole " "(NexIUM) 20 mg capsule, Take 20 mg by mouth, Disp: , Rfl:     fluticasone (FLOVENT HFA) 110 MCG/ACT inhaler, Inhale 2 puffs 2 (two) times a day Rinse mouth after use., Disp: , Rfl:     metFORMIN (GLUCOPHAGE) 500 mg tablet, Take 500 mg by mouth daily with breakfast, Disp: , Rfl:     montelukast (SINGULAIR) 10 mg tablet, Take 1 tablet (10 mg total) by mouth daily at bedtime, Disp: 30 tablet, Rfl: 2    Multiple Vitamin (MULTIVITAMIN) tablet, Take 1 tablet by mouth daily, Disp: , Rfl:       Active Problems     Patient Active Problem List   Diagnosis    Left knee pain    Tear of medial meniscus of left knee, current    Status post arthroscopic partial medial meniscectomy    Arthritis of left knee    Trauma to left eye    Chest wall pain    Hyperlipidemia    Abnormal EKG    SIRS (systemic inflammatory response syndrome) (HCC)    Systolic murmur    Allergies         Past Medical History     Past Medical History:   Diagnosis Date    Allergic     Left knee pain     Prediabetes     Seasonal allergies     Wears glasses          Surgical History     Past Surgical History:   Procedure Laterality Date    COLONOSCOPY      [place] ARTHRT W/EXC SEMILUNAR CRTLG KNEE MEDIAL/LAT Left 1/14/2020    Procedure: KNEE ARTHROSCOPIC MENISCECTOMY MEDIAL; PARTIAL SYNOVECTOMY;  Surgeon: Agustin Patrick DO;  [facility];  Service: Orthopedics         Family History     Family History   Problem Relation Age of Onset    No Known Problems Mother          Social History     Social History     Social History     Tobacco Use   Smoking Status Never   Smokeless Tobacco Never         Pertinent Lab Values     Lab Results   Component Value Date    CREATININE 0.81 07/20/2024       No results found for: \"PSA\"    @RESULTRCNT(1H])@      Pertinent Imaging       No results found.      Portions of the record may have been created with voice recognition software.  Occasional wrong word or \"sound a like\" substitutions may have occurred due to the inherent " limitations of voice recognition software.  In addition some of the content generated from this outpatient encounter includes information designed for patient education and/or communication back to the referring provider.  Read the chart carefully and recognize, using context, where substitutions have occurred.

## 2024-10-31 ENCOUNTER — TELEPHONE (OUTPATIENT)
Dept: UROLOGY | Facility: MEDICAL CENTER | Age: 59
End: 2024-10-31

## 2024-10-31 ENCOUNTER — HOSPITAL ENCOUNTER (OUTPATIENT)
Dept: ULTRASOUND IMAGING | Facility: HOSPITAL | Age: 59
Discharge: HOME | End: 2024-10-31
Attending: UROLOGY
Payer: COMMERCIAL

## 2024-10-31 DIAGNOSIS — N43.0 ENCYSTED HYDROCELE: ICD-10-CM

## 2024-10-31 PROCEDURE — 76870 US EXAM SCROTUM: CPT

## 2024-10-31 NOTE — TELEPHONE ENCOUNTER
Spoke with patient and confirmed surgery date of 1/17/2025  Type of surgery:HYDROCELECTOMY  Operating physician:Dr. Lyon  Location of surgery: [facility]    Verbally went over prep with patient 10/31/24   NPO  Bowel prep? No  Hospital calls afternoon prior with arrival time (calls Friday afternoon for Monday surgery)  Patient needs ride to and from surgery   outpatient  Pre-op testing to be done 2 weeks prior to surgery. All testing can be done as a walk-in. EKG can only be done as a walk-in at any [facility].  (lCBC, CMP, URINE CULTURE)  Blood thinners:   None  Clearances needed: None    Mailed to patient on 11/1/24  Copy of packet scanned into Media  Labs in packet and in electronic record   Soap prep in packet  pre-op H&P in packet     Consent: in media

## 2024-11-01 ENCOUNTER — PREP FOR PROCEDURE (OUTPATIENT)
Dept: UROLOGY | Facility: MEDICAL CENTER | Age: 59
End: 2024-11-01

## 2024-11-01 DIAGNOSIS — R39.89 SUSPECTED UTI: ICD-10-CM

## 2024-11-01 DIAGNOSIS — Z01.812 PRE-OPERATIVE LABORATORY EXAMINATION: ICD-10-CM

## 2024-11-01 DIAGNOSIS — N43.0 ENCYSTED HYDROCELE: Primary | ICD-10-CM

## 2024-11-27 ENCOUNTER — TELEPHONE (OUTPATIENT)
Age: 59
End: 2024-11-27

## 2024-11-27 NOTE — TELEPHONE ENCOUNTER
Patient has upcoming surgery with you in January. US shows indeterminate findings of potential epididymal cysts vs hydrocele. FYI

## 2024-11-27 NOTE — TELEPHONE ENCOUNTER
PT calling because he received a letter from radiology about his US results.  Pt would like a call back about  he results to discuss,    Please review and call.  Pt can be reached at [phone]

## 2024-12-02 DIAGNOSIS — N50.819 PAIN IN TESTICLE, UNSPECIFIED LATERALITY: Primary | ICD-10-CM

## 2024-12-02 RX ORDER — NAPROXEN 500 MG/1
500 TABLET ORAL 2 TIMES DAILY WITH MEALS
Qty: 14 TABLET | Refills: 0 | Status: SHIPPED | OUTPATIENT
Start: 2024-12-02 | End: 2024-12-09

## 2024-12-02 NOTE — TELEPHONE ENCOUNTER
Relayed to patient that naproxen was sent to his pharmacy to try to help with the pain he is experiencing. Verbalized understanding

## 2024-12-02 NOTE — TELEPHONE ENCOUNTER
Patient walked into office with letter from radiology for findings on US and for increasing pain. Patient reports he hasn't been able to work as a  in approximately a week due to not being able to sit down due to the pain in his scrotum. He called last week to discuss findings on US and is upset that he hadnt heard back from office especially due to the increasing pain.     Due to the pain he would like to speak with someone today. I went back and spoke with BOB Chun and she reviewed imaging. She reports there is nothing acute in the imaging that would require immediate intervention as he is already scheduled for surgery in January. Due to the pain though we could bring him in to see the AP more urgently to see if he would be able to do an aspiration to potentially helped with his pain.     Went out and relayed this to the patient. Patient agreeable to appt on this coming Wednesday to see Willard. Went over conservative measures for pain control. Patient asks if there is anything else he can take for pain control. Advised I would discuss with AP and then call him back     Spoke with Lay in office after he left and she said she will send him Naproxen that should like him to start taking.

## 2024-12-04 ENCOUNTER — PROCEDURE VISIT (OUTPATIENT)
Dept: UROLOGY | Facility: CLINIC | Age: 59
End: 2024-12-04
Payer: COMMERCIAL

## 2024-12-04 VITALS
HEART RATE: 99 BPM | WEIGHT: 315 LBS | SYSTOLIC BLOOD PRESSURE: 140 MMHG | BODY MASS INDEX: 45.1 KG/M2 | OXYGEN SATURATION: 95 % | HEIGHT: 70 IN | DIASTOLIC BLOOD PRESSURE: 80 MMHG

## 2024-12-04 DIAGNOSIS — N43.41: Primary | ICD-10-CM

## 2024-12-04 PROCEDURE — 99213 OFFICE O/P EST LOW 20 MIN: CPT | Performed by: PHYSICIAN ASSISTANT

## 2024-12-04 RX ORDER — DOXYCYCLINE 100 MG/1
100 CAPSULE ORAL 2 TIMES DAILY
Qty: 14 CAPSULE | Refills: 0 | Status: SHIPPED | OUTPATIENT
Start: 2024-12-04 | End: 2024-12-11

## 2024-12-04 RX ORDER — DICLOFENAC SODIUM 75 MG/1
75 TABLET, DELAYED RELEASE ORAL 2 TIMES DAILY
Qty: 20 TABLET | Refills: 1 | Status: SHIPPED | OUTPATIENT
Start: 2024-12-04 | End: 2024-12-14

## 2024-12-04 NOTE — PROGRESS NOTES
UROLOGY PROGRESS NOTE   Patient Identifiers: Agustin Renner (MRN [id number])  Date of Service: 12/4/2024    Subjective:   59-year-old morbidly obese male history of bilateral hydroceles.  He was seen by Dr. Lyon in October and is scheduled for left hydrocele surgery in January.  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  He has now swelling and pain since that time unable to sit.  He has to ice it.  He comes in the office for follow-up.    Reason for visit: Left-sided hydrocele/orchialgia    Objective:     VITALS:    Vitals:    12/04/24 0859   BP: 140/80   Pulse: 99   SpO2: 95%           LABS:  Lab Results   Component Value Date    HGB 12.4 04/04/2023    HCT 39.2 04/04/2023    WBC 9.30 04/04/2023     04/04/2023   ]    Lab Results   Component Value Date    K 5.0 07/20/2024     07/20/2024    CO2 26 07/20/2024    BUN 19 07/20/2024    CREATININE 0.81 07/20/2024    CALCIUM 9.6 07/20/2024    GLUCOSE 121 07/15/2021   ]        INPATIENT MEDS:    Current Outpatient Medications:     albuterol (PROVENTIL HFA,VENTOLIN HFA) 90 mcg/act inhaler, Inhale 2 puffs every 6 (six) hours as needed for wheezing or shortness of breath, Disp: 1 Inhaler, Rfl: 0    Ascorbic Acid (VITAMIN C) 100 MG tablet, Take 100 mg by mouth daily, Disp: , Rfl:     aspirin (ECOTRIN LOW STRENGTH) 81 mg EC tablet, Take 162 mg by mouth daily, Disp: , Rfl:     clobetasol (TEMOVATE) 0.05 % external solution, , Disp: , Rfl:     esomeprazole (NexIUM) 20 mg capsule, Take 20 mg by mouth, Disp: , Rfl:     fluticasone (FLOVENT HFA) 110 MCG/ACT inhaler, Inhale 2 puffs 2 (two) times a day Rinse mouth after use., Disp: , Rfl:     metFORMIN (GLUCOPHAGE) 500 mg tablet, Take 500 mg by mouth daily with breakfast, Disp: , Rfl:     montelukast (SINGULAIR) 10 mg tablet, Take 1 tablet (10 mg total) by mouth daily at bedtime, Disp: 30 tablet, Rfl: 2    Multiple Vitamin  "(MULTIVITAMIN) tablet, Take 1 tablet by mouth daily, Disp: , Rfl:     naproxen (EC NAPROSYN) 500 MG EC tablet, Take 1 tablet (500 mg total) by mouth 2 (two) times a day with meals for 7 days, Disp: 14 tablet, Rfl: 0      Physical Exam:   /80 (BP Location: Left arm, Patient Position: Sitting, Cuff Size: Standard)   Pulse 99   Ht 5' 10\" (1.778 m)   Wt (!) 149 kg (328 lb)   SpO2 95%   BMI 47.06 kg/m²   GEN: no acute distress    RESP: breathing comfortably with no accessory muscle use    ABD: soft, non-tender, non-distended   INCISION:    EXT: no significant peripheral edema   (Male): Penis circumcised, phallus normal, meatus patent.  Testicles descended into scrotum bilaterally obvious fluid collection superior to the left testicle consistent with spermatocele.  No inguinal hernias bilaterally.      RADIOLOGY:   New ultrasound ordered    Assessment:   #1.  Left-sided orchialgia  #2.  Left sided spermatocele/hydrocele    Plan:   -I would like to reorder his ultrasound since this represents a change  -I will put him on antibiotics and anti-inflammatories and he should continue using ice  -Hopefully he will improve and can be out of work for 2 weeks  -I can see him again for reevaluation if needed          "

## 2025-01-14 ENCOUNTER — TELEPHONE (OUTPATIENT)
Age: 60
End: 2025-01-14

## 2025-01-14 RX ORDER — FLUTICASONE FUROATE 200 UG/1
1 POWDER RESPIRATORY (INHALATION) AS NEEDED
COMMUNITY

## 2025-01-14 NOTE — TELEPHONE ENCOUNTER
Patient called to follow up to be sure the office received pre-op testing lab results st.    Results are in the chart now from [facility].      No further assistance is needed.

## 2025-01-14 NOTE — PRE-PROCEDURE INSTRUCTIONS
Pre-Surgery Instructions:   Medication Instructions    Ascorbic Acid (VITAMIN C) 100 MG tablet Stop taking 7 days prior to surgery.    esomeprazole (NexIUM) 20 mg capsule Take day of surgery.    fluticasone (Arnuity Ellipta) 200 MCG/ACT AEPB inhaler Uses PRN- OK to take day of surgery    metFORMIN (GLUCOPHAGE) 500 mg tablet Hold day of surgery.    Multiple Vitamin (MULTIVITAMIN) tablet Stop taking 7 days prior to surgery.    aspirin (ECOTRIN LOW STRENGTH) 81 mg EC tablet Pt stopped 2 weeks ago on own    montelukast (SINGULAIR) 10 mg tablet Ok tot dolores as needed    Medication instructions for day surgery reviewed. Please use only a sip of water to take your instructed medications. Avoid all over the counter vitamins, supplements and NSAIDS for one week prior to surgery per anesthesia guidelines. Tylenol is ok to take as needed.     You will receive a call one business day prior to surgery with an arrival time and hospital directions. If your surgery is scheduled on a Monday, the hospital will be calling you on the Friday prior to your surgery. If you have not heard from anyone by 8pm, please call the hospital supervisor through the hospital  at [phone]. ([place] [phone] or [place] [phone]).    Do not eat or drink anything after midnight the night before your surgery, including candy, mints, lifesavers, or chewing gum. Do not drink alcohol 24hrs before your surgery. Try not to smoke at least 24hrs before your surgery.       Follow the pre surgery showering instructions as listed in the “My Surgical Experience Booklet” or otherwise provided by your surgeon's office. Do not use a blade to shave the surgical area 1 week before surgery. It is okay to use a clean electric clippers up to 24 hours before surgery. Do not apply any lotions, creams, including makeup, cologne, deodorant, or perfumes after showering on the day of your surgery. Do not use dry shampoo, hair spray, hair gel, or any type of  hair products.     No contact lenses, eye make-up, or artificial eyelashes. Remove nail polish, including gel polish, and any artificial, gel, or acrylic nails if possible. Remove all jewelry including rings and body piercing jewelry.     Wear causal clothing that is easy to take on and off. Consider your type of surgery.    Keep any valuables, jewelry, piercings at home. Please bring any specially ordered equipment (sling, braces) if indicated.    Arrange for a responsible person to drive you to and from the hospital on the day of your surgery. Please confirm the visitor policy for the day of your procedure when you receive your phone call with an arrival time.     Call the surgeon's office with any new illnesses, exposures, or additional questions prior to surgery.    Please reference your “My Surgical Experience Booklet” for additional information to prepare for your upcoming surgery.

## 2025-01-17 ENCOUNTER — TELEPHONE (OUTPATIENT)
Dept: UROLOGY | Facility: CLINIC | Age: 60
End: 2025-01-17

## 2025-01-17 ENCOUNTER — ANESTHESIA (OUTPATIENT)
Dept: PERIOP | Facility: HOSPITAL | Age: 60
End: 2025-01-17
Payer: COMMERCIAL

## 2025-01-17 ENCOUNTER — HOSPITAL ENCOUNTER (OUTPATIENT)
Facility: HOSPITAL | Age: 60
Setting detail: OUTPATIENT SURGERY
Discharge: HOME | End: 2025-01-17
Attending: UROLOGY | Admitting: UROLOGY
Payer: COMMERCIAL

## 2025-01-17 ENCOUNTER — ANESTHESIA EVENT (OUTPATIENT)
Dept: PERIOP | Facility: HOSPITAL | Age: 60
End: 2025-01-17
Payer: COMMERCIAL

## 2025-01-17 VITALS
HEART RATE: 64 BPM | OXYGEN SATURATION: 96 % | TEMPERATURE: 98.2 F | RESPIRATION RATE: 18 BRPM | SYSTOLIC BLOOD PRESSURE: 134 MMHG | DIASTOLIC BLOOD PRESSURE: 73 MMHG

## 2025-01-17 DIAGNOSIS — N43.0 ENCYSTED HYDROCELE: Primary | ICD-10-CM

## 2025-01-17 DIAGNOSIS — J30.1 SEASONAL ALLERGIC RHINITIS DUE TO POLLEN: ICD-10-CM

## 2025-01-17 LAB — GLUCOSE SERPL-MCNC: 92 MG/DL (ref 65–140)

## 2025-01-17 PROCEDURE — 88342 IMHCHEM/IMCYTCHM 1ST ANTB: CPT | Performed by: PATHOLOGY

## 2025-01-17 PROCEDURE — NC001 PR NO CHARGE: Performed by: UROLOGY

## 2025-01-17 PROCEDURE — 88304 TISSUE EXAM BY PATHOLOGIST: CPT | Performed by: PATHOLOGY

## 2025-01-17 PROCEDURE — 54840 REMOVE EPIDIDYMIS LESION: CPT | Performed by: UROLOGY

## 2025-01-17 PROCEDURE — 82948 REAGENT STRIP/BLOOD GLUCOSE: CPT

## 2025-01-17 RX ORDER — MIDAZOLAM HYDROCHLORIDE 2 MG/2ML
INJECTION, SOLUTION INTRAMUSCULAR; INTRAVENOUS AS NEEDED
Status: DISCONTINUED | OUTPATIENT
Start: 2025-01-17 | End: 2025-01-17

## 2025-01-17 RX ORDER — DEXAMETHASONE SODIUM PHOSPHATE 10 MG/ML
INJECTION, SOLUTION INTRAMUSCULAR; INTRAVENOUS AS NEEDED
Status: DISCONTINUED | OUTPATIENT
Start: 2025-01-17 | End: 2025-01-17

## 2025-01-17 RX ORDER — OXYCODONE HYDROCHLORIDE 5 MG/1
5 TABLET ORAL EVERY 4 HOURS PRN
Refills: 0 | Status: DISCONTINUED | OUTPATIENT
Start: 2025-01-17 | End: 2025-01-17 | Stop reason: HOSPADM

## 2025-01-17 RX ORDER — ONDANSETRON 2 MG/ML
INJECTION INTRAMUSCULAR; INTRAVENOUS AS NEEDED
Status: DISCONTINUED | OUTPATIENT
Start: 2025-01-17 | End: 2025-01-17

## 2025-01-17 RX ORDER — FENTANYL CITRATE/PF 50 MCG/ML
50 SYRINGE (ML) INJECTION
Refills: 0 | Status: DISCONTINUED | OUTPATIENT
Start: 2025-01-17 | End: 2025-01-17 | Stop reason: HOSPADM

## 2025-01-17 RX ORDER — NAPROXEN 500 MG/1
500 TABLET ORAL 2 TIMES DAILY WITH MEALS
Qty: 15 TABLET | Refills: 0 | Status: SHIPPED | OUTPATIENT
Start: 2025-01-17 | End: 2025-01-25

## 2025-01-17 RX ORDER — HYDROMORPHONE HCL/PF 1 MG/ML
0.5 SYRINGE (ML) INJECTION
Refills: 0 | Status: DISCONTINUED | OUTPATIENT
Start: 2025-01-17 | End: 2025-01-17 | Stop reason: HOSPADM

## 2025-01-17 RX ORDER — OXYCODONE HYDROCHLORIDE 5 MG/1
5 TABLET ORAL EVERY 4 HOURS PRN
Qty: 10 TABLET | Refills: 0 | Status: SHIPPED | OUTPATIENT
Start: 2025-01-17 | End: 2025-01-22

## 2025-01-17 RX ORDER — PROMETHAZINE HYDROCHLORIDE 25 MG/ML
12.5 INJECTION, SOLUTION INTRAMUSCULAR; INTRAVENOUS ONCE AS NEEDED
Status: DISCONTINUED | OUTPATIENT
Start: 2025-01-17 | End: 2025-01-17 | Stop reason: HOSPADM

## 2025-01-17 RX ORDER — SODIUM CHLORIDE, SODIUM LACTATE, POTASSIUM CHLORIDE, CALCIUM CHLORIDE 600; 310; 30; 20 MG/100ML; MG/100ML; MG/100ML; MG/100ML
INJECTION, SOLUTION INTRAVENOUS CONTINUOUS PRN
Status: DISCONTINUED | OUTPATIENT
Start: 2025-01-17 | End: 2025-01-17

## 2025-01-17 RX ORDER — SODIUM CHLORIDE, SODIUM LACTATE, POTASSIUM CHLORIDE, CALCIUM CHLORIDE 600; 310; 30; 20 MG/100ML; MG/100ML; MG/100ML; MG/100ML
125 INJECTION, SOLUTION INTRAVENOUS CONTINUOUS
Status: DISCONTINUED | OUTPATIENT
Start: 2025-01-17 | End: 2025-01-17 | Stop reason: HOSPADM

## 2025-01-17 RX ORDER — LIDOCAINE HYDROCHLORIDE 10 MG/ML
INJECTION, SOLUTION EPIDURAL; INFILTRATION; INTRACAUDAL; PERINEURAL AS NEEDED
Status: DISCONTINUED | OUTPATIENT
Start: 2025-01-17 | End: 2025-01-17

## 2025-01-17 RX ORDER — PROPOFOL 10 MG/ML
INJECTION, EMULSION INTRAVENOUS AS NEEDED
Status: DISCONTINUED | OUTPATIENT
Start: 2025-01-17 | End: 2025-01-17

## 2025-01-17 RX ORDER — CEFAZOLIN SODIUM 2 G/50ML
2000 SOLUTION INTRAVENOUS ONCE
Status: COMPLETED | OUTPATIENT
Start: 2025-01-17 | End: 2025-01-17

## 2025-01-17 RX ORDER — CEFAZOLIN SODIUM 1 G/50ML
1000 SOLUTION INTRAVENOUS ONCE
Status: DISCONTINUED | OUTPATIENT
Start: 2025-01-17 | End: 2025-01-17 | Stop reason: HOSPADM

## 2025-01-17 RX ORDER — FENTANYL CITRATE 50 UG/ML
INJECTION, SOLUTION INTRAMUSCULAR; INTRAVENOUS AS NEEDED
Status: DISCONTINUED | OUTPATIENT
Start: 2025-01-17 | End: 2025-01-17

## 2025-01-17 RX ORDER — OXYCODONE HYDROCHLORIDE 5 MG/1
10 TABLET ORAL EVERY 4 HOURS PRN
Refills: 0 | Status: DISCONTINUED | OUTPATIENT
Start: 2025-01-17 | End: 2025-01-17 | Stop reason: HOSPADM

## 2025-01-17 RX ORDER — ACETAMINOPHEN 325 MG/1
650 TABLET ORAL EVERY 4 HOURS PRN
Start: 2025-01-17

## 2025-01-17 RX ORDER — KETOROLAC TROMETHAMINE 30 MG/ML
INJECTION, SOLUTION INTRAMUSCULAR; INTRAVENOUS AS NEEDED
Status: DISCONTINUED | OUTPATIENT
Start: 2025-01-17 | End: 2025-01-17

## 2025-01-17 RX ORDER — DOCUSATE SODIUM 100 MG/1
100 CAPSULE, LIQUID FILLED ORAL 2 TIMES DAILY
Qty: 30 CAPSULE | Refills: 0 | Status: SHIPPED | OUTPATIENT
Start: 2025-01-17 | End: 2025-02-01

## 2025-01-17 RX ORDER — MAGNESIUM HYDROXIDE 1200 MG/15ML
LIQUID ORAL AS NEEDED
Status: DISCONTINUED | OUTPATIENT
Start: 2025-01-17 | End: 2025-01-17 | Stop reason: HOSPADM

## 2025-01-17 RX ORDER — HYDROMORPHONE HCL/PF 1 MG/ML
0.5 SYRINGE (ML) INJECTION EVERY 2 HOUR PRN
Refills: 0 | Status: DISCONTINUED | OUTPATIENT
Start: 2025-01-17 | End: 2025-01-17 | Stop reason: HOSPADM

## 2025-01-17 RX ORDER — ONDANSETRON 2 MG/ML
4 INJECTION INTRAMUSCULAR; INTRAVENOUS ONCE AS NEEDED
Status: DISCONTINUED | OUTPATIENT
Start: 2025-01-17 | End: 2025-01-17 | Stop reason: HOSPADM

## 2025-01-17 RX ADMIN — FENTANYL CITRATE 50 MCG: 50 INJECTION INTRAMUSCULAR; INTRAVENOUS at 15:35

## 2025-01-17 RX ADMIN — DEXMEDETOMIDINE 12 MCG: 100 INJECTION, SOLUTION INTRAVENOUS at 14:51

## 2025-01-17 RX ADMIN — CEFAZOLIN SODIUM 1000 MG: 2 SOLUTION INTRAVENOUS at 14:21

## 2025-01-17 RX ADMIN — DEXAMETHASONE SODIUM PHOSPHATE 10 MG: 10 INJECTION INTRAMUSCULAR; INTRAVENOUS at 14:21

## 2025-01-17 RX ADMIN — FENTANYL CITRATE 100 MCG: 50 INJECTION INTRAMUSCULAR; INTRAVENOUS at 14:21

## 2025-01-17 RX ADMIN — OXYCODONE HYDROCHLORIDE 10 MG: 5 TABLET ORAL at 16:57

## 2025-01-17 RX ADMIN — PROPOFOL 200 MG: 10 INJECTION, EMULSION INTRAVENOUS at 14:21

## 2025-01-17 RX ADMIN — LIDOCAINE HYDROCHLORIDE 50 MG: 10 INJECTION, SOLUTION EPIDURAL; INFILTRATION; INTRACAUDAL at 14:21

## 2025-01-17 RX ADMIN — PROPOFOL 50 MG: 10 INJECTION, EMULSION INTRAVENOUS at 14:43

## 2025-01-17 RX ADMIN — CEFAZOLIN SODIUM 2000 MG: 2 SOLUTION INTRAVENOUS at 14:14

## 2025-01-17 RX ADMIN — MIDAZOLAM 2 MG: 1 INJECTION INTRAMUSCULAR; INTRAVENOUS at 13:33

## 2025-01-17 RX ADMIN — KETOROLAC TROMETHAMINE 30 MG: 30 INJECTION, SOLUTION INTRAMUSCULAR; INTRAVENOUS at 15:36

## 2025-01-17 RX ADMIN — PROPOFOL 50 MG: 10 INJECTION, EMULSION INTRAVENOUS at 14:38

## 2025-01-17 RX ADMIN — FENTANYL CITRATE 50 MCG: 50 INJECTION INTRAMUSCULAR; INTRAVENOUS at 14:45

## 2025-01-17 RX ADMIN — ONDANSETRON 4 MG: 2 INJECTION, SOLUTION INTRAMUSCULAR; INTRAVENOUS at 14:21

## 2025-01-17 RX ADMIN — DEXMEDETOMIDINE 8 MCG: 100 INJECTION, SOLUTION INTRAVENOUS at 14:43

## 2025-01-17 RX ADMIN — SODIUM CHLORIDE, SODIUM LACTATE, POTASSIUM CHLORIDE, AND CALCIUM CHLORIDE: .6; .31; .03; .02 INJECTION, SOLUTION INTRAVENOUS at 13:28

## 2025-01-17 NOTE — OP NOTE
Operative Note     PATIENT:  Agustin Renner (MRN [id number])    DATE OF PROCEDURE:   1/17/2025    PRE-OP DIAGNOSIS:   1) left spermatocele    POST-OP DIAGNOSIS:   1) left spermatocele    PROCEDURES PERFORMED:  1) spermatocelectomy, left    SURGEON:  Juno Lyon MD    ASSISTANTS:      NOTE:  There were no qualified teaching residents to assist with this case    ANESTHESIA: General     COMPLICATIONS:   None    ANTIBIOTICS:  Cephazolin    INTRAOPERATIVE THROMBOEMBOLISM PROPHYLAXIS:  Pneumatic compression stockings     INDICATIONS FOR PROCEDURE:  Agustin Renner is an 59 y.o. old male with a symptomatic left spermatocele.  He has had a scrotal ultrasound which was negative for intratesticular mass or tumor. After discussing the options, the patient elected to undergo a hydrocelectomy.  We discussed the procedure in detail, the alternatives, and the risks, and they signed informed consent to proceed.    PROCEDURE IN DETAIL:   The patient was identified and brought to the OR.  Antibiotic prophylaxis and DVT prophylaxis were administered.  They were placed in the comfortable supine position with care to pad all pressure points.  The scrotal hair was clipped and they were prepped and draped in the usual sterile fashion using ChloraPrep.  A surgical time out was performed with all in the room in agreement with the correct patient, procedure, indications, and laterality.          Next a transverse incision was made on the Left hemiscrotum. This was carried down through the cremasteric fibers using electrocautery and down to the hydrocele sac. The spermatic cord and hydrocele sac was delivered through this incision.  Intraoperative findings were consistent with a spermatocele.  I performed meticulous dissection  the spermatocele from the spermatic cord structures and the tunica albuginea.  A stalk of the spermatocele was identified, clamped and ligated with a 0 silk.  The specimen was removed intact.  Of  note, the tunica vaginalis was entered and this was required in order to completely free the spermatocele from the testicle.  I closed this in 2 layers using Vicryl sutures.  The wound was then copiously irrigated.  I next reapproximated the remaining contents in a inverted/Jabaley fashion.The wound was copiously irrigated. Hemostasis was achieved using electrocautery.  The testes was delivered back into the base of the scrotum. The dartos layer was closed using running 3-0 Vicryl. The skin was then closed using 4-0 chromic horizontal mattress suture. A sterile dressing and scrotal support was applied.    All needle and instrument counts were correct.  The patient was placed back supine, awakened from general anesthesia and brought to recovery room in stable condition.        ESTIMATED BLOOD LOSS:  Minimal      DRAINS:      SPECIMENS:   Order Name Source Comment Collection Info Order Time   TISSUE EXAM Spermatocele LEFT SPERMATOCELE SAC Collected By: Juno Lyon MD 1/17/2025  3:11 PM     Release to patient through Mychart   Immediate             COMPLICATIONS: none    DISPOSITION: PACU

## 2025-01-17 NOTE — H&P
UROLOGY HISTORY AND PHYSICAL     Patient Identifiers: Agustin Renner (MRN [id number])      Date of Service: 1/17/2025        ASSESSMENT:     59 y.o. old male with eft hydrocele versus spermatocele.      PLAN:     Left hydrocelectomy (spermatocelectomy if indicated intraoperatively)      History of Present Illness:     Agustin Renner is a 59 y.o. old with a history of left hydrocele versus spermatocele    Past Medical, Past Surgical History:     Past Medical History:   Diagnosis Date    Allergic     Asthma     Left knee pain     Prediabetes     Seasonal allergies     Sleep apnea     doesnt wear cpap    Wears glasses    :    Past Surgical History:   Procedure Laterality Date    COLONOSCOPY      MI ARTHRT W/EXC SEMILUNAR CRTLG KNEE MEDIAL/LAT Left 1/14/2020    Procedure: KNEE ARTHROSCOPIC MENISCECTOMY MEDIAL; PARTIAL SYNOVECTOMY;  Surgeon: Agustin Patrick DO;  Location: [facility];  Service: Orthopedics   :    Medications, Allergies:     Current Facility-Administered Medications:     ceFAZolin (ANCEF) IVPB (premix in dextrose) 2,000 mg 50 mL, 2,000 mg, Intravenous, Once **AND** ceFAZolin (ANCEF) IVPB (premix in dextrose) 1,000 mg 50 mL, 1,000 mg, Intravenous, Once, Juno Lyon MD    Allergies:  Allergies   Allergen Reactions    Cat Hair Extract Sneezing, Itching and Other (See Comments)     Other reaction(s): Sneezing    Acer Negundo Allergy Skin Test Other (See Comments)   :    Social and Family History:   Social History:   Social History     Tobacco Use    Smoking status: Never    Smokeless tobacco: Never   Substance Use Topics    Alcohol use: No    Drug use: No   .    Social History     Tobacco Use   Smoking Status Never   Smokeless Tobacco Never       Family History:  Family History   Problem Relation Age of Onset    No Known Problems Mother    :     Review of Systems:     General: Fever, chills, or night sweats: negative  Cardiac: Negative for chest pain.    Pulmonary: Negative for shortness of  breath.  Gastrointestinal: Abdominal pain negative  Nausea, vomiting, or diarrhea negative  Genitourinary: See HPI above.  Patient does nothave hematuria.  All other systems queried were negative.    Physical Exam:   General: Patient is pleasant and in NAD. Awake and alert  /72   Pulse 57   Temp 98 °F (36.7 °C) (Temporal)   Resp 18   SpO2 97%   HEENT:  Normocephalic atraumatic  Cardiac:  Regular rate and rhythm, Peripheral edema: negative  Pulmonary: Non-labored breathing, CTAB  Abdomen: Soft, non-tender, non-distended.  No surgical scars.  No masses, tenderness, hernias noted.    Genitourinary: negative CVA tenderness, neg suprapubic tenderness.  Extremities: normal movement in all 4       Labs:     Lab Results   Component Value Date    HGB 12.4 04/04/2023    HCT 39.2 04/04/2023    WBC 9.30 04/04/2023     04/04/2023   ]    Lab Results   Component Value Date    K 5.0 07/20/2024     07/20/2024    CO2 26 07/20/2024    BUN 19 07/20/2024    CREATININE 0.81 07/20/2024    CALCIUM 9.6 07/20/2024    GLUCOSE 121 07/15/2021   ]    Imaging:   I personally reviewed the images and report of the following studies, and reviewed them with the patient:        Thank you for allowing me to participate in this patients’ care.  Please do not hesitate to call with any additional questions.  Juno Lyon MD

## 2025-01-17 NOTE — TELEPHONE ENCOUNTER
Status post hematocele excision today.  Please schedule postop with advanced practitioner in 2 to 3 weeks

## 2025-01-17 NOTE — ANESTHESIA PREPROCEDURE EVALUATION
Procedure:  HYDROCELECTOMY (Left: Scrotum)    Relevant Problems   CARDIO   (+) Chest wall pain   (+) Hyperlipidemia   (+) Systolic murmur      MUSCULOSKELETAL   (+) Arthritis of left knee        Physical Exam    Airway    Mallampati score: I  TM Distance: >3 FB  Neck ROM: full     Dental       Cardiovascular  Cardiovascular exam normal    Pulmonary  Pulmonary exam normal     Other Findings        Anesthesia Plan  ASA Score- 2     Anesthesia Type- general with ASA Monitors.         Additional Monitors:     Airway Plan: LMA.           Plan Factors-Exercise tolerance (METS): >4 METS.    Chart reviewed. EKG reviewed. Imaging results reviewed. Existing labs reviewed. Patient summary reviewed.    Patient is not a current smoker.  Patient did not smoke on day of surgery.    Obstructive sleep apnea risk education given perioperatively.        Induction- intravenous.    Postoperative Plan- Plan for postoperative opioid use. Planned trial extubation        Informed Consent- Anesthetic plan and risks discussed with patient.  I personally reviewed this patient with the CRNA. Discussed and agreed on the Anesthesia Plan with the CRNA..      NPO Status:  No vitals data found for the desired time range.

## 2025-01-18 NOTE — ANESTHESIA POSTPROCEDURE EVALUATION
Post-Op Assessment Note            No anethesia notable event occurred.            Last Filed PACU Vitals:  Vitals Value Taken Time   Temp 98.2 °F (36.8 °C) 01/17/25 1615   Pulse 68 01/17/25 1616   /88 01/17/25 1615   Resp 20 01/17/25 1616   SpO2 91 % 01/17/25 1616   Vitals shown include unfiled device data.    Modified Teresita:     Vitals Value Taken Time   Activity 2 01/17/25 1615   Respiration 2 01/17/25 1615   Circulation 2 01/17/25 1615   Consciousness 2 01/17/25 1615   Oxygen Saturation 2 01/17/25 1615     Modified Teresita Score: 10

## 2025-01-20 NOTE — TELEPHONE ENCOUNTER
Could be normal as his surgery was only 3 days ago. Agree with rest, scrotal elevation/support, ice application. If swelling not improving will need to be seen for exam in the office.

## 2025-01-20 NOTE — TELEPHONE ENCOUNTER
Patient called today to say that he cannot make PM appts. Pt states that he's told the office several times that the earliest time of day is needed due to work.    Please reschedule appointment for post op.    In addition pt stated that he has a post op concern and was warm transferred to [facility] for further assistance.    Call back [phone]

## 2025-01-20 NOTE — TELEPHONE ENCOUNTER
Patient called in to report he is having scrotal swelling post op. States swelling is triple the size of what it should be. Denies any fevers, pain or redness. He does report some bruising. States he has been icing the area but concerned about the amount of swelling. Please advise on recommendations or if patient should be seen in the office.

## 2025-01-20 NOTE — TELEPHONE ENCOUNTER
Post Op Note    Agustin Renner is a 59 y.o. male s/p SPERMATOCELECTOMY (Left: Scrotum)  performed 1/17/25.  Agustin Renner is a patient of Dr. Dr. Lyon and is seen at the [place] office.     How would you rate your pain on a scale from 1 to 10, 10 being the worst pain ever? 1  Have you had a fever? No  Do you have any difficulty urinating? no  If the patient has an incision- do you have any redness around the incision or any drainage from the incision? No    Do you have any other questions or concerns that I can address at this time?   -Went over potential/expected post op symptoms   -discussed ER precautions  -Confirmed post op appts and locations     Advised patient that post op swelling is to be expected went over conservative measures again. Advised if the swelling increases or pain worsens he should call office for a visit to assess. Scheduled follow up for patient for post op visit.

## 2025-01-21 ENCOUNTER — PROCEDURE VISIT (OUTPATIENT)
Dept: UROLOGY | Facility: CLINIC | Age: 60
End: 2025-01-21

## 2025-01-21 DIAGNOSIS — N43.41: Primary | ICD-10-CM

## 2025-01-21 PROCEDURE — 99024 POSTOP FOLLOW-UP VISIT: CPT | Performed by: PHYSICIAN ASSISTANT

## 2025-01-21 NOTE — TELEPHONE ENCOUNTER
Patient calling and states he is using several gauze due to increase drainage.     Drainage is orange like in color.    Denies fever, chills, dysuria, N/V    Discussed ED precautions, advised to continue changing the gauze when wet and keep clean

## 2025-01-21 NOTE — TELEPHONE ENCOUNTER
"Patient called office to let office know, he is on his way, no appt.      Post op patient of [place], called and said he needs to be seen and showing up at . Patient said his hydrocele started leaking last night and \"pouring out\", feels he lost a gallon and needs to be seen now. He is worried, I offered Willard at 12 to take a look , but he said nope I am coming now.    Denies pain, patients spouse said stitches look ok. He packed it with gauze and holding towel.   "

## 2025-01-22 NOTE — PROGRESS NOTES
Assessment:  #1.  Spermatocele ectomy  #2.  Wound dehiscence      Plan:    Doing well postoperatively.  Scrotum was swollen and the wound dehisced slightly with a gush of drainage  He has a follow-up appointment in about 10 days with the nurse practitioner which he should keep    Wound care discussed.  Steri-Strips applied and a gauze dressing  Subjective     Agustin Renner presents to the clinic 4 days status post spermatocelectomy for post-op follow-up.  The patient reports no problems with eating, bowel movements, voiding, or their wound. Pain is controlled without any medications..    Objective     There were no vitals taken for this visit.  General:  alert   Abdomen:    Incision:  The wound is clean and dry.  There is a 1 cm dehiscence in the midline.  This is closed with Steri-Strips and a dry gauze           Pathology:

## 2025-01-22 NOTE — TELEPHONE ENCOUNTER
Patient called this morning to give update. Overnight patient changed several gauze pads and maxi pads, still continuing to drain fluid a lot from hematocele excision.     Denies fevers, pain, nausea/vomiting.     Patient is requesting for a provider to call him. The patient is worried.    ED precautions reviewed.     #[phone]

## 2025-01-23 NOTE — TELEPHONE ENCOUNTER
01/17/25  spermatocelectomy left    Patient very concerned that drainage has not decreased since seeing Willard SANTACRUZ In office on 01/21/25    Denies redness, or fever, or chills;   Very concerned with incision drainage.   Drainage thin light orange color.   Using maxi pads     Reviewed s/s of infection  ER / hydration precautions reviewed;  Patient called this morning also;    Patient would like to discuss his concerns with provider.     # [phone]

## 2025-01-24 ENCOUNTER — OFFICE VISIT (OUTPATIENT)
Dept: UROLOGY | Facility: AMBULATORY SURGERY CENTER | Age: 60
End: 2025-01-24

## 2025-01-24 ENCOUNTER — TELEPHONE (OUTPATIENT)
Dept: UROLOGY | Facility: AMBULATORY SURGERY CENTER | Age: 60
End: 2025-01-24

## 2025-01-24 VITALS
HEIGHT: 70 IN | HEART RATE: 82 BPM | SYSTOLIC BLOOD PRESSURE: 128 MMHG | BODY MASS INDEX: 45.1 KG/M2 | OXYGEN SATURATION: 94 % | WEIGHT: 315 LBS | DIASTOLIC BLOOD PRESSURE: 70 MMHG

## 2025-01-24 DIAGNOSIS — N43.40 SPERMATOCELE: Primary | ICD-10-CM

## 2025-01-24 PROCEDURE — 88304 TISSUE EXAM BY PATHOLOGIST: CPT | Performed by: PATHOLOGY

## 2025-01-24 PROCEDURE — 88342 IMHCHEM/IMCYTCHM 1ST ANTB: CPT | Performed by: PATHOLOGY

## 2025-01-24 PROCEDURE — 99024 POSTOP FOLLOW-UP VISIT: CPT | Performed by: UROLOGY

## 2025-01-24 RX ORDER — BUSPIRONE HYDROCHLORIDE 10 MG/1
10 TABLET ORAL 2 TIMES DAILY PRN
COMMUNITY
Start: 2024-12-04

## 2025-01-24 NOTE — TELEPHONE ENCOUNTER
"Patient called very upset, requesting his records to be printed and placed at , so he can see wound care specialist at [facility].    Patient is requesting head of urology to call him within one hour.  Patient was seen by Willard 1/21/25, and continues to have post op wound drainage. Patient said his wound is \"wide\" open at this time.    Patient called the past two days with no return call from office. No appt available in office today.     Patient referred to ED at this time for further evaluation.        MRN given to supervisor and advised to send to provider pool high priority.   "

## 2025-01-24 NOTE — PROGRESS NOTES
Agustin is a 59-year-old male who recently underwent spermatocelectomy by Dr. Lyon approximately 1 week ago.  He states that he has had increasing and significant drainage from the incision over the last day or so.  He presents today for a wound check.    On examination there is slight skin separation.  The subcutaneous tissue is easily  as there is copious clear/straw-colored drainage.  The wound was open.  The underlying tissue was inspected and appeared relatively healthy with granulation tissue noted.  There is no sign of infection, cellulitis, crepitus, or Mario's gangrene.  I then packed the incision with half-inch Nu Gauze and instructed the wife to perform this daily.    Impression: Status post left spermatocele ectomy, wound dehiscence    Plan: There is no sign of wound infection.  I do not feel that antibiotics are required at this time.  I recommend at least daily dressing changes with half-inch Nu Gauze.  It does not need to be moistened as there is significant fluid discharge from the incision.  Wound check in the [place] office next week with either Dr. Lyon or advanced practitioner advised.

## 2025-01-24 NOTE — TELEPHONE ENCOUNTER
Patient calling to follow up on getting scheduled for next week for wound care, patient very hopeful for appt on Monday. I advised patient that it looks as though this is already being worked on and patient was appreciative. Also inquiring about potential for wound care nurses. Patient very complimentary of office staff as well as staff he has spoken to over phone.

## 2025-01-24 NOTE — TELEPHONE ENCOUNTER
Per Dr Isabel's note:  Return for next week GP or [facility] wound check .     PT is asking if this could be done on Monday due to taking care of it in a different way and he wanted to make sure it is being done at home correctly.    He also had a question if there are nurses that would come out to due this type of wound care?    I did not see anything for either Dr at the [place] office.    I also was not sure who to send this to you or someone in [place].    Please assist.  Thanks !!!

## 2025-01-24 NOTE — LETTER
January 24, 2025     Juno Lyon MD  [address]  [address]  [place] [address]    Patient: Agustin Renner   YOB: 1965   Date of Visit: 1/24/2025       Dear Dr. Lyon:    Thank you for referring Agustin Renner to me for evaluation. Below are my notes for this consultation.    If you have questions, please do not hesitate to call me. I look forward to following your patient along with you.         Sincerely,        Tom Isabel MD        CC: No Recipients    Tom Isabel MD  1/24/2025  2:45 PM  Signed  Agustin is a 59-year-old male who recently underwent spermatocelectomy by Dr. Lyon approximately 1 week ago.  He states that he has had increasing and significant drainage from the incision over the last day or so.  He presents today for a wound check.    On examination there is slight skin separation.  The subcutaneous tissue is easily  as there is copious clear/straw-colored drainage.  The wound was open.  The underlying tissue was inspected and appeared relatively healthy with granulation tissue noted.  There is no sign of infection, cellulitis, crepitus, or Mario's gangrene.  I then packed the incision with half-inch Nu Gauze and instructed the wife to perform this daily.    Impression: Status post left spermatocele ectomy, wound dehiscence    Plan: There is no sign of wound infection.  I do not feel that antibiotics are required at this time.  I recommend at least daily dressing changes with half-inch Nu Gauze.  It does not need to be moistened as there is significant fluid discharge from the incision.  Wound check in the [place] office next week with either Dr. Lyon or advanced practitioner advised.

## 2025-01-24 NOTE — TELEPHONE ENCOUNTER
Called and left generic message as comm consent does not have voicemail checked off. Asking for a call back, number provided.    When patient calls back please advise Dr Lyon is not currently in office, and Dr Isabel is currently in the OR. Dr Isabel has an afternoon appointment. I have tentatively scheduled this in the [place] office. Due to it being Friday, should be seen if concern for wound dehiscence. Nurse may also be involved in visit to assist.

## 2025-01-24 NOTE — TELEPHONE ENCOUNTER
Patient called back, message relayed. Appointment,time and location for today at 2PM confirmed. No further questions or concerns.

## 2025-01-27 ENCOUNTER — PROCEDURE VISIT (OUTPATIENT)
Dept: UROLOGY | Facility: CLINIC | Age: 60
End: 2025-01-27

## 2025-01-27 DIAGNOSIS — N43.40 SPERMATOCELE: Primary | ICD-10-CM

## 2025-01-27 PROCEDURE — 99024 POSTOP FOLLOW-UP VISIT: CPT | Performed by: PHYSICIAN ASSISTANT

## 2025-01-27 RX ORDER — CEPHALEXIN 500 MG/1
500 CAPSULE ORAL EVERY 8 HOURS SCHEDULED
Qty: 21 CAPSULE | Refills: 0 | Status: SHIPPED | OUTPATIENT
Start: 2025-01-27 | End: 2025-02-03

## 2025-01-27 NOTE — TELEPHONE ENCOUNTER
Patient called with additional needs, and asked to pass message to Bailee.     When his wife is packing wound, how much gauze/packing should she insert? The tweezers he was given have a sharp/pointed end. Do we have any blunt end tweezers to give him? Patients wife is nervous about poking around, due to risk for infection.    Message sent to Bailee. .

## 2025-01-27 NOTE — PROGRESS NOTES
Assessment:  #1.  Spermatocele ectomy  #2.  Wound dehiscence    Plan:    -Follow-up in 2 days for wound check and packing change  -I sent antibiotics to his pharmacy.  Wound care discussed.  Subjective     Agustin Renner presents to the clinic 3 weeks status post spermatocelectomy for post-op follow-up.  The patient reports wound drainage sero-sanguinous. Pain is controlled without any medications..    Objective     There were no vitals taken for this visit.  General:  alert   Abdomen:    Incision:  Wound opening is clean with serosanguineous drainage.  The old packing is removed.  The wound is swabbed with a cotton-tipped applicator soaked in normal saline.  2 separate ribbons of half-inch Nu Gauze were inserted into the wound lightly.  1 went anterior and 1 1 posterior.     Lab Review        Pathology:

## 2025-01-27 NOTE — TELEPHONE ENCOUNTER
Dr Lyon would like to see patient. Can get him in for tomorrow, Dr Lyon not in office today.     Called and spoke with patient. Reviewed Dr Lyon can see him tomorrow morning. He states he is agreeable to that appt, but he would also like to be seen today as his scrotum is larger and hard again. He states on Friday he was having the incisional issue but the scrotum had drained and was softer and less swollen. Advised I can see him today as a nurse visit and bring Pastor MCGARRY in and we can un pack and assess the wound and scrotum. He was agreeable and appt scheduled for 10am on the [place] nurse schedule. He knows PA will come up with a plan, but he will likely still come in tomorrow for appt with Dr Lyon. He also asked about visiting nurses as he was told Friday wound packing everyday for 2 weeks. Advised I would look into that, it is unlikely [facility] would come everyday. We can discuss when he comes in for visit today. He verbalized understanding.

## 2025-01-27 NOTE — TELEPHONE ENCOUNTER
Advised patient that keflex was sent to his pharmacy and advised its possible to cause stomach upset which is a common side effect and he should take It with food. Patient verbalized understanding

## 2025-01-28 ENCOUNTER — OFFICE VISIT (OUTPATIENT)
Dept: UROLOGY | Facility: CLINIC | Age: 60
End: 2025-01-28

## 2025-01-28 VITALS
HEART RATE: 88 BPM | HEIGHT: 70 IN | OXYGEN SATURATION: 93 % | WEIGHT: 315 LBS | DIASTOLIC BLOOD PRESSURE: 82 MMHG | BODY MASS INDEX: 45.1 KG/M2 | SYSTOLIC BLOOD PRESSURE: 102 MMHG

## 2025-01-28 DIAGNOSIS — N43.40 SPERMATOCELE: Primary | ICD-10-CM

## 2025-01-28 PROCEDURE — 99024 POSTOP FOLLOW-UP VISIT: CPT | Performed by: UROLOGY

## 2025-01-28 NOTE — PROGRESS NOTES
Referring Physician: Jermaine Shea DO (Inactive)  A copy of this note was sent to the referring physician.       Diagnoses and all orders for this visit:    Spermatocele              Assessment and plan:       1 left spermatocele s/p repair  - complicated by incisional dehiscence    #2 urinary frequency  - Patient counseled to cut down on iced tea and drink more water  - We will reassess this at his hydrocelectomy follow-up and consider medical management    Wound repacked.  No signs of infection.  Counseled that I anticipate 2 weeks of recovery    Juno Lyon MD      Chief Complaint     Postop    History of Present Illness     Agustin Renner is a 59 y.o. returns for postop    Detailed Urologic History     - please refer to HPI    Review of Systems     Review of Systems   Constitutional: Negative for activity change and fatigue.   HENT: Negative for congestion.    Eyes: Negative for visual disturbance.   Respiratory: Negative for shortness of breath and wheezing.    Cardiovascular: Negative for chest pain and leg swelling.   Gastrointestinal: Negative for abdominal pain.   Endocrine: Negative for polyuria.   Genitourinary: Negative for dysuria, flank pain, hematuria and urgency.   Musculoskeletal: Negative for back pain.   Allergic/Immunologic: Negative for immunocompromised state.   Neurological: Negative for dizziness and numbness.   Psychiatric/Behavioral: Negative for dysphoric mood.   All other systems reviewed and are negative.          Allergies     Allergies   Allergen Reactions    Cat Dander Sneezing, Itching and Other (See Comments)     Other reaction(s): Sneezing    Acer Negundo Allergy Skin Test Other (See Comments)       Physical Exam       Physical Exam  Constitutional:       General: He is not in acute distress.     Appearance: He is well-developed.   HENT:      Head: Normocephalic and atraumatic.   Cardiovascular:      Comments: Negative lower extremity edema  Pulmonary:      Effort:  "Pulmonary effort is normal.      Breath sounds: Normal breath sounds.   Abdominal:      Palpations: Abdomen is soft.   Musculoskeletal:         General: Normal range of motion.      Cervical back: Normal range of motion.   Skin:     General: Skin is warm.   Neurological:      Mental Status: He is alert and oriented to person, place, and time.   Psychiatric:         Behavior: Behavior normal.     Obese abdomen  Approximately 6 to 7 cm left fluid collection consistent with a hydrocele or potentially a spermatocele      Vital Signs  Vitals:    01/28/25 0847   BP: 102/82   BP Location: Left arm   Patient Position: Sitting   Cuff Size: Large   Pulse: 88   SpO2: 93%   Weight: (!) 148 kg (326 lb)   Height: 5' 10\" (1.778 m)         Current Medications       Current Outpatient Medications:     acetaminophen (TYLENOL) 325 mg tablet, Take 2 tablets (650 mg total) by mouth every 4 (four) hours as needed for mild pain, Disp: , Rfl:     Ascorbic Acid (VITAMIN C) 100 MG tablet, Take 100 mg by mouth daily, Disp: , Rfl:     cephalexin (KEFLEX) 500 mg capsule, Take 1 capsule (500 mg total) by mouth every 8 (eight) hours for 7 days, Disp: 21 capsule, Rfl: 0    diclofenac (VOLTAREN) 75 mg EC tablet, Take 1 tablet (75 mg total) by mouth 2 (two) times a day for 10 days, Disp: 20 tablet, Rfl: 1    esomeprazole (NexIUM) 20 mg capsule, Take 20 mg by mouth, Disp: , Rfl:     metFORMIN (GLUCOPHAGE) 500 mg tablet, Take 500 mg by mouth daily with breakfast, Disp: , Rfl:     Multiple Vitamin (MULTIVITAMIN) tablet, Take 1 tablet by mouth daily, Disp: , Rfl:     albuterol (PROVENTIL HFA,VENTOLIN HFA) 90 mcg/act inhaler, Inhale 2 puffs every 6 (six) hours as needed for wheezing or shortness of breath (Patient not taking: Reported on 1/24/2025), Disp: 1 Inhaler, Rfl: 0    aspirin (ECOTRIN LOW STRENGTH) 81 mg EC tablet, Take 162 mg by mouth daily (Patient not taking: Reported on 1/24/2025), Disp: , Rfl:     busPIRone (BUSPAR) 10 mg tablet, Take 10 mg " by mouth 2 (two) times a day as needed (Patient not taking: Reported on 1/24/2025), Disp: , Rfl:     clobetasol (TEMOVATE) 0.05 % external solution, , Disp: , Rfl:     docusate sodium (COLACE) 100 mg capsule, Take 1 capsule (100 mg total) by mouth 2 (two) times a day for 15 days (Patient not taking: Reported on 1/24/2025), Disp: 30 capsule, Rfl: 0    fluticasone (Arnuity Ellipta) 200 MCG/ACT AEPB inhaler, Inhale 1 puff if needed Rinse mouth after use. (Patient not taking: Reported on 1/24/2025), Disp: , Rfl:     montelukast (SINGULAIR) 10 mg tablet, Take 1 tablet (10 mg total) by mouth daily at bedtime (Patient not taking: Reported on 1/24/2025), Disp: 30 tablet, Rfl: 2    naproxen (NAPROSYN) 500 mg tablet, Take 1 tablet (500 mg total) by mouth 2 (two) times a day with meals for 8 days For pain (Patient not taking: Reported on 1/24/2025), Disp: 15 tablet, Rfl: 0      Active Problems     Patient Active Problem List   Diagnosis    Left knee pain    Tear of medial meniscus of left knee, current    Status post arthroscopic partial medial meniscectomy    Arthritis of left knee    Trauma to left eye    Chest wall pain    Hyperlipidemia    Abnormal EKG    SIRS (systemic inflammatory response syndrome) (HCC)    Systolic murmur    Allergies    Spermatocele         Past Medical History     Past Medical History:   Diagnosis Date    Allergic     Asthma     Left knee pain     Prediabetes     Seasonal allergies     Sleep apnea     doesnt wear cpap    Wears glasses          Surgical History     Past Surgical History:   Procedure Laterality Date    COLONOSCOPY      PA ARTHRT W/EXC SEMILUNAR CRTLG KNEE MEDIAL/LAT Left 1/14/2020    Procedure: KNEE ARTHROSCOPIC MENISCECTOMY MEDIAL; PARTIAL SYNOVECTOMY;  Surgeon: Agustin Patrick DO;  [facility]: [facility];  Service: Orthopedics    PA EXCISION HYDROCELE UNILATERAL Left 1/17/2025    Procedure: SPERMATOCELECTOMY;  Surgeon: Juno Lyon MD;  Location: [facility];  Service: Urology  "        Family History     Family History   Problem Relation Age of Onset    No Known Problems Mother          Social History     Social History     Social History     Tobacco Use   Smoking Status Never   Smokeless Tobacco Never         Pertinent Lab Values     Lab Results   Component Value Date    CREATININE 0.81 07/20/2024       No results found for: \"PSA\"    @RESULTRCNT(1H])@      Pertinent Imaging       No results found.      Portions of the record may have been created with voice recognition software.  Occasional wrong word or \"sound a like\" substitutions may have occurred due to the inherent limitations of voice recognition software.  In addition some of the content generated from this outpatient encounter includes information designed for patient education and/or communication back to the referring provider.  Read the chart carefully and recognize, using context, where substitutions have occurred.    "

## 2025-01-28 NOTE — TELEPHONE ENCOUNTER
Patient calling to find out about VNA services.     He states that Nurse Bailee was assisting with this and hoping to speak with her.      #[phone]

## 2025-01-29 ENCOUNTER — PROCEDURE VISIT (OUTPATIENT)
Dept: UROLOGY | Facility: CLINIC | Age: 60
End: 2025-01-29

## 2025-01-29 DIAGNOSIS — N43.40 SPERMATOCELE: Primary | ICD-10-CM

## 2025-01-29 PROCEDURE — 99024 POSTOP FOLLOW-UP VISIT: CPT

## 2025-01-30 ENCOUNTER — PROCEDURE VISIT (OUTPATIENT)
Dept: UROLOGY | Facility: CLINIC | Age: 60
End: 2025-01-30

## 2025-01-30 ENCOUNTER — TELEPHONE (OUTPATIENT)
Dept: UROLOGY | Facility: CLINIC | Age: 60
End: 2025-01-30

## 2025-01-30 VITALS — OXYGEN SATURATION: 96 % | HEART RATE: 65 BPM | SYSTOLIC BLOOD PRESSURE: 140 MMHG | DIASTOLIC BLOOD PRESSURE: 80 MMHG

## 2025-01-30 DIAGNOSIS — N43.40 SPERMATOCELE: Primary | ICD-10-CM

## 2025-01-30 PROCEDURE — 99024 POSTOP FOLLOW-UP VISIT: CPT

## 2025-01-30 NOTE — TELEPHONE ENCOUNTER
Patient returned call to office. He will be there at 10am, not 10:30am. Patient said he has to much to do after appt.

## 2025-01-30 NOTE — TELEPHONE ENCOUNTER
LM to contact office to confirm that he will be able to come in at 10:30 vs 10 am. If he calls back, please confirm that he can be here at 10:30 am

## 2025-01-30 NOTE — PROGRESS NOTES
1/30/2025  Agustin Renner is a 59 y.o. male  [id number]    Diagnosis:        Patient presents for dressing/packing change  managed by Dr. Lyon       Plan:    Patient will follow up tomorrow for appointment as scheduled          Vitals:    01/30/25 1001   BP: 140/80   Pulse: 65   SpO2: 96%           No results found for this or any previous visit (from the past 4 hours).      Procedure:    Two separate strips of packing removed in their entirety. Mild serosanguinous drainage is present on packing and dripping out after removal onto pad.      Tissue appears healthy with no signs of infection. Two new 1/2 inch packing strips inserted into wound without issue. Tails left out for easy removal.         Lay Jacinto RN

## 2025-01-30 NOTE — PROGRESS NOTES
1/29/2025  Agustin Renner is a 59 y.o. male  [id number]    Diagnosis:    Chief Complaint    Post-op         Patient presents for dressing/packing change  managed by Dr. Lyon    Plan:    Patient will follow up tomorrow for appointment as scheduled       Assessment:    Patient on arrival is unhappy regarding VNA order placement and that he will not be receiving VNA. Went over the plan as I understood it, that he would come in for changes today and Friday and then no changes over the weekend and the return on Monday. Patient was unhappy with this as he states this was not what was relayed to him in visit yesterday with Dr. Lyon. I advised him that he would not qualify for VNA despite his insurance saying they will cover it. Additionally I advised that VNA is unlikely to come out daily for dressing changes. Advised I would speak with the providers and have them come to speak with patient.    Willard RODRÍGUEZ came in to speak with patient. Plan will be he comes to office for packing changes tomorrow and Friday and Monday. Will see provider Tuesday.   There were no vitals filed for this visit.        No results found for this or any previous visit (from the past 4 hours).      Procedure:    Two separate strips of packing removed in their entirety. Mild to moderate serosanguinous drainage is present on packing and dripping out after removal.     Tissue appears healthy with no signs of infection. Two new 1/2 inch packing strips inserted into wound without issue. Tails left out for easy removal.         Lay Jacinto RN

## 2025-01-31 ENCOUNTER — PROCEDURE VISIT (OUTPATIENT)
Dept: UROLOGY | Facility: CLINIC | Age: 60
End: 2025-01-31

## 2025-01-31 DIAGNOSIS — N43.40 SPERMATOCELE: Primary | ICD-10-CM

## 2025-01-31 PROCEDURE — 99024 POSTOP FOLLOW-UP VISIT: CPT

## 2025-01-31 NOTE — PROGRESS NOTES
1/31/2025    Agustin Renner is a 59 y.o. male  [id number]    Diagnosis:  Chief Complaint    Post-op         Patient presents for wound packing removal and replacement s/p spermatocelectomy on 1/17/2025 with Dr. Lyon    Plan:  Patient will follow up next week as scheduled on Monday, 2/3 and Wednesday 2/5.     Procedure:  Patient reports feeling ok,scrotum still swollen and firm to touch per him.    Scrotum and wound inspected. Incision pink, clean and healing from dehiscence. No signs of infection. Scrotum is firm and swollen on the left side, about the size of a tennis ball. No bruising noted and does not look abnormal for 2 weeks post op.     Two separate strips of packing removed in their entirety. Mild serosanguinous drainage is present on packing and dripping out after removal onto pad.      Tissue appears healthy with no signs of infection. Sterile cotton swab used to internally inspect wound. Bottom portion healed and does not need packing.     Two new 1/2 inch packing strips inserted into wound, top portion without issue. Tails left out for easy removal. Less packing inserted than what was removed today.     Patient knows plan for next week. He also knows I will update Dr Lyon. Plan for Willard to decide on on going plan during 2/5 appt. He knows he can expect to be out of work for 4-6 weeks post op. Reviewed if he needs paperwork filled out he can bring it in.     Also reviewed VNA issue, he does not meet homebound criteria. His insurance would cover it if he qualified. He understood and was thankful for the nursing care he has received thus far.     Bailee Chew RN BSN

## 2025-01-31 NOTE — PROGRESS NOTES
I was present for visit today. Patient was upset previous day in regards to confusion on VNA and differing plans of care.     I spoke to Dr Lyon via telephone on 1/29/25 and we discussed final plan of care moving forward: appts 1/30, 1/31, 2/3 and 2/5 for wound re packing. At the 2/5 visit it will be a joint appt with Willard. He will then decide what is needed for ongoing care.     I explained this to patient, he understood and agreed to plan. Reviewed there were differing plans related to seeing three providers. No plan was wrong, but wound care is not black and white. Moving forward we will stick to above listed plan. Patient verbalized understanding.

## 2025-02-03 ENCOUNTER — PROCEDURE VISIT (OUTPATIENT)
Dept: UROLOGY | Facility: CLINIC | Age: 60
End: 2025-02-03

## 2025-02-03 VITALS
OXYGEN SATURATION: 100 % | RESPIRATION RATE: 18 BRPM | BODY MASS INDEX: 46.78 KG/M2 | SYSTOLIC BLOOD PRESSURE: 142 MMHG | DIASTOLIC BLOOD PRESSURE: 92 MMHG | HEIGHT: 70 IN | HEART RATE: 57 BPM

## 2025-02-03 DIAGNOSIS — N43.40 SPERMATOCELE: Primary | ICD-10-CM

## 2025-02-03 PROCEDURE — 99024 POSTOP FOLLOW-UP VISIT: CPT

## 2025-02-05 ENCOUNTER — OFFICE VISIT (OUTPATIENT)
Dept: UROLOGY | Facility: CLINIC | Age: 60
End: 2025-02-05

## 2025-02-05 ENCOUNTER — TELEPHONE (OUTPATIENT)
Dept: UROLOGY | Facility: CLINIC | Age: 60
End: 2025-02-05

## 2025-02-05 VITALS
HEIGHT: 70 IN | SYSTOLIC BLOOD PRESSURE: 132 MMHG | BODY MASS INDEX: 46.78 KG/M2 | OXYGEN SATURATION: 97 % | HEART RATE: 64 BPM | DIASTOLIC BLOOD PRESSURE: 80 MMHG

## 2025-02-05 DIAGNOSIS — N43.40 SPERMATOCELE: ICD-10-CM

## 2025-02-05 DIAGNOSIS — R35.0 BENIGN PROSTATIC HYPERPLASIA WITH URINARY FREQUENCY: Primary | ICD-10-CM

## 2025-02-05 DIAGNOSIS — N40.1 BENIGN PROSTATIC HYPERPLASIA WITH URINARY FREQUENCY: Primary | ICD-10-CM

## 2025-02-05 PROCEDURE — 99024 POSTOP FOLLOW-UP VISIT: CPT | Performed by: UROLOGY

## 2025-02-05 RX ORDER — TAMSULOSIN HYDROCHLORIDE 0.4 MG/1
0.4 CAPSULE ORAL
Qty: 30 CAPSULE | Refills: 6 | Status: SHIPPED | OUTPATIENT
Start: 2025-02-05

## 2025-02-05 NOTE — PROGRESS NOTES
Referring Physician: Jermaine Shea DO (Inactive)  A copy of this note was sent to the referring physician.       Diagnoses and all orders for this visit:    Benign prostatic hyperplasia with urinary frequency  -     tamsulosin (FLOMAX) 0.4 mg; Take 1 capsule (0.4 mg total) by mouth daily with dinner    Spermatocele                Assessment and plan:       1 left spermatocele s/p repair  - complicated by incisional dehiscence    #2 urinary frequency  - Symptoms persisting despite conservative management  - Tamsulosin prescribed.  Dosing adverse effects reviewed.      Wound carefully examined.  Healing well.  The incision has closed approximately 50%.  Since my last examination.  Packing is no longer required.    Plan is for dry dressing to incision to keep the wound clean and dry.  No need for continued packing at this point I do not believe.    Tamsulosin prescribed for his urinary symptoms.  Follow-up with me for repeat wound check in 2 weeks or sooner if needed.    Juno Lyon MD      Chief Complaint     Postop    History of Present Illness     Agustin Renner is a 59 y.o. returns for postop    Detailed Urologic History     - please refer to HPI    Review of Systems     Review of Systems   Constitutional: Negative for activity change and fatigue.   HENT: Negative for congestion.    Eyes: Negative for visual disturbance.   Respiratory: Negative for shortness of breath and wheezing.    Cardiovascular: Negative for chest pain and leg swelling.   Gastrointestinal: Negative for abdominal pain.   Endocrine: Negative for polyuria.   Genitourinary: Negative for dysuria, flank pain, hematuria and urgency.   Musculoskeletal: Negative for back pain.   Allergic/Immunologic: Negative for immunocompromised state.   Neurological: Negative for dizziness and numbness.   Psychiatric/Behavioral: Negative for dysphoric mood.   All other systems reviewed and are negative.          Allergies     Allergies   Allergen  "Reactions    Cat Dander Sneezing, Itching and Other (See Comments)     Other reaction(s): Sneezing    Acer Negundo Allergy Skin Test Other (See Comments)       Physical Exam       Physical Exam  Constitutional:       General: He is not in acute distress.     Appearance: He is well-developed.   HENT:      Head: Normocephalic and atraumatic.   Cardiovascular:      Comments: Negative lower extremity edema  Pulmonary:      Effort: Pulmonary effort is normal.      Breath sounds: Normal breath sounds.   Abdominal:      Palpations: Abdomen is soft.   Musculoskeletal:         General: Normal range of motion.      Cervical back: Normal range of motion.   Skin:     General: Skin is warm.   Neurological:      Mental Status: He is alert and oriented to person, place, and time.   Psychiatric:         Behavior: Behavior normal.     Obese abdomen  Approximately 6 to 7 cm left fluid collection consistent with a hydrocele or potentially a spermatocele      Vital Signs  Vitals:    02/05/25 0957   BP: 132/80   BP Location: Left arm   Patient Position: Sitting   Cuff Size: Standard   Pulse: 64   SpO2: 97%   Height: 5' 10\" (1.778 m)         Current Medications       Current Outpatient Medications:     acetaminophen (TYLENOL) 325 mg tablet, Take 2 tablets (650 mg total) by mouth every 4 (four) hours as needed for mild pain, Disp: , Rfl:     Ascorbic Acid (VITAMIN C) 100 MG tablet, Take 100 mg by mouth daily, Disp: , Rfl:     esomeprazole (NexIUM) 20 mg capsule, Take 20 mg by mouth, Disp: , Rfl:     metFORMIN (GLUCOPHAGE) 500 mg tablet, Take 500 mg by mouth daily with breakfast, Disp: , Rfl:     Multiple Vitamin (MULTIVITAMIN) tablet, Take 1 tablet by mouth daily, Disp: , Rfl:     tamsulosin (FLOMAX) 0.4 mg, Take 1 capsule (0.4 mg total) by mouth daily with dinner, Disp: 30 capsule, Rfl: 6    albuterol (PROVENTIL HFA,VENTOLIN HFA) 90 mcg/act inhaler, Inhale 2 puffs every 6 (six) hours as needed for wheezing or shortness of breath (Patient " not taking: Reported on 1/24/2025), Disp: 1 Inhaler, Rfl: 0    aspirin (ECOTRIN LOW STRENGTH) 81 mg EC tablet, Take 162 mg by mouth daily (Patient not taking: Reported on 1/24/2025), Disp: , Rfl:     busPIRone (BUSPAR) 10 mg tablet, Take 10 mg by mouth 2 (two) times a day as needed (Patient not taking: Reported on 1/24/2025), Disp: , Rfl:     clobetasol (TEMOVATE) 0.05 % external solution, , Disp: , Rfl:     diclofenac (VOLTAREN) 75 mg EC tablet, Take 1 tablet (75 mg total) by mouth 2 (two) times a day for 10 days, Disp: 20 tablet, Rfl: 1    docusate sodium (COLACE) 100 mg capsule, Take 1 capsule (100 mg total) by mouth 2 (two) times a day for 15 days (Patient not taking: Reported on 1/24/2025), Disp: 30 capsule, Rfl: 0    fluticasone (Arnuity Ellipta) 200 MCG/ACT AEPB inhaler, Inhale 1 puff if needed Rinse mouth after use. (Patient not taking: Reported on 1/24/2025), Disp: , Rfl:     montelukast (SINGULAIR) 10 mg tablet, Take 1 tablet (10 mg total) by mouth daily at bedtime (Patient not taking: Reported on 1/24/2025), Disp: 30 tablet, Rfl: 2    naproxen (NAPROSYN) 500 mg tablet, Take 1 tablet (500 mg total) by mouth 2 (two) times a day with meals for 8 days For pain (Patient not taking: Reported on 1/24/2025), Disp: 15 tablet, Rfl: 0      Active Problems     Patient Active Problem List   Diagnosis    Left knee pain    Tear of medial meniscus of left knee, current    Status post arthroscopic partial medial meniscectomy    Arthritis of left knee    Trauma to left eye    Chest wall pain    Hyperlipidemia    Abnormal EKG    SIRS (systemic inflammatory response syndrome) (HCC)    Systolic murmur    Allergies    Spermatocele    Benign prostatic hyperplasia with urinary frequency         Past Medical History     Past Medical History:   Diagnosis Date    Allergic     Asthma     Left knee pain     Prediabetes     Seasonal allergies     Sleep apnea     doesnt wear cpap    Wears glasses          Surgical History     Past  "Surgical History:   Procedure Laterality Date    COLONOSCOPY      AR ARTHRT W/EXC SEMILUNAR CRTLG KNEE MEDIAL/LAT Left 1/14/2020    Procedure: KNEE ARTHROSCOPIC MENISCECTOMY MEDIAL; PARTIAL SYNOVECTOMY;  Surgeon: Agustin Patrick DO;  Location: [facility];  Service: Orthopedics    AR EXCISION HYDROCELE UNILATERAL Left 1/17/2025    Procedure: SPERMATOCELECTOMY;  Surgeon: Juno Lyon MD;  Location[facility];  Service: Urology         Family History     Family History   Problem Relation Age of Onset    No Known Problems Mother          Social History     Social History     Social History     Tobacco Use   Smoking Status Never   Smokeless Tobacco Never         Pertinent Lab Values     Lab Results   Component Value Date    CREATININE 0.81 07/20/2024       No results found for: \"PSA\"    @RESULTRCNT(1H])@      Pertinent Imaging       No results found.      Portions of the record may have been created with voice recognition software.  Occasional wrong word or \"sound a like\" substitutions may have occurred due to the inherent limitations of voice recognition software.  In addition some of the content generated from this outpatient encounter includes information designed for patient education and/or communication back to the referring provider.  Read the chart carefully and recognize, using context, where substitutions have occurred.    "

## 2025-02-05 NOTE — PROGRESS NOTES
"2/3/2025    Agustin Renner  1965  [id number]    Diagnosis      Patient presents for wound packing removal and repolacement managed by Dr. Lyon    Plan  Patient will follow up as scheduled on 2/5.    Procedure   Patient reports the weekend went well. He used tegaderms over wound during showers. Incision pink, clean and healing, no signs of infection. Wound dehiscence site is smaller than Friday. Left side of scrotum still swollen, slightly smaller than a tennis ball, no bruising noted.     Two separate strips of packing removed in their entirety. Mild serosanguinous drainage is present on packing and dripping out after removal onto pad.      Tissue appears healthy with no signs of infection. Sterile cotton swab used to internally inspect wound. Only top portion needs packing.     One new 1/2 inch packing strip inserted without issue, tail left out for easy removal.       Vitals:    02/03/25 0954   BP: 142/92   Pulse: 57   Resp: 18   SpO2: 100%   Height: 5' 10\" (1.778 m)           Bailee Chew RN BSN       "

## 2025-02-05 NOTE — TELEPHONE ENCOUNTER
Pt saw ZG today and I verbally confirmed the appt and gave pt an appt card with date, time, location.   Please schedule this for pt. Thank you    Provider check out note:  Return for wound check w OSMANI 2/19 at 1:30p, OK to double book

## 2025-02-14 ENCOUNTER — TELEPHONE (OUTPATIENT)
Age: 60
End: 2025-02-14

## 2025-02-14 NOTE — TELEPHONE ENCOUNTER
Patient called in. Patient had SPERMATOCELECTOMY (Left:  on 1/17/25. Since then he has had issues with delayed wound healing at incision. Patient had been coming in to have packing and dressing done. Patient was last seen on 2/5 when  Dr. Lyon told him it no longer needed any packing and was 50% healed. Patient stating that it hasn't changed since then. Patient stating he hasn't been dressing it with anything since then. State she was not told to put anything on it. Patient has appt on 2/19 to see Dr. Lyon but has concerns regarding this and is hopeful to see office nurse, in particular Bailee on Monday d/t she is the one who was addressing it previously. Denies any redness, or infection like symptoms but it does drain fluid. Reviewed ED precautions.

## 2025-02-19 ENCOUNTER — OFFICE VISIT (OUTPATIENT)
Dept: UROLOGY | Facility: CLINIC | Age: 60
End: 2025-02-19

## 2025-02-19 VITALS
HEART RATE: 71 BPM | BODY MASS INDEX: 30.78 KG/M2 | SYSTOLIC BLOOD PRESSURE: 122 MMHG | OXYGEN SATURATION: 96 % | DIASTOLIC BLOOD PRESSURE: 80 MMHG | WEIGHT: 215 LBS | HEIGHT: 70 IN

## 2025-02-19 DIAGNOSIS — N43.40 SPERMATOCELE: Primary | ICD-10-CM

## 2025-02-19 PROCEDURE — 99024 POSTOP FOLLOW-UP VISIT: CPT | Performed by: UROLOGY

## 2025-02-19 NOTE — PROGRESS NOTES
Referring Physician: Jermaine Shea DO (Inactive)  A copy of this note was sent to the referring physician.       There are no diagnoses linked to this encounter.              Assessment and plan:       1 left spermatocele s/p repair  - complicated by incisional dehiscence  -At this point the patient has had no further drainage and his wound is completely closed at deep layers.  At the skin layer the incision is 50% closed    #2 urinary frequency  - Symptoms persisting despite conservative management  - Tamsulosin prescribed.  Dosing adverse effects reviewed.    Wound is almost completely healed.  Excellent postoperative result.  Discussed that the induration in his testicle will continue to improve over the next few months    Patient requested 1 final postop visit and I will schedule this in 2 weeks with our advanced practitioner team.    Juno Lyon MD      Chief Complaint     Postop    History of Present Illness     Agustin Renner is a 59 y.o. returns for postop    Detailed Urologic History     - please refer to HPI    Review of Systems     Review of Systems   Constitutional: Negative for activity change and fatigue.   HENT: Negative for congestion.    Eyes: Negative for visual disturbance.   Respiratory: Negative for shortness of breath and wheezing.    Cardiovascular: Negative for chest pain and leg swelling.   Gastrointestinal: Negative for abdominal pain.   Endocrine: Negative for polyuria.   Genitourinary: Negative for dysuria, flank pain, hematuria and urgency.   Musculoskeletal: Negative for back pain.   Allergic/Immunologic: Negative for immunocompromised state.   Neurological: Negative for dizziness and numbness.   Psychiatric/Behavioral: Negative for dysphoric mood.   All other systems reviewed and are negative.          Allergies     Allergies   Allergen Reactions    Cat Dander Sneezing, Itching and Other (See Comments)     Other reaction(s): Sneezing    Acer Negundo Allergy Skin Test  "Other (See Comments)       Physical Exam       Physical Exam  Constitutional:       General: He is not in acute distress.     Appearance: He is well-developed.   HENT:      Head: Normocephalic and atraumatic.   Cardiovascular:      Comments: Negative lower extremity edema  Pulmonary:      Effort: Pulmonary effort is normal.      Breath sounds: Normal breath sounds.   Abdominal:      Palpations: Abdomen is soft.   Musculoskeletal:         General: Normal range of motion.      Cervical back: Normal range of motion.   Skin:     General: Skin is warm.   Neurological:      Mental Status: He is alert and oriented to person, place, and time.   Psychiatric:         Behavior: Behavior normal.     See above    Vital Signs  Vitals:    02/19/25 1332   BP: 122/80   BP Location: Left arm   Patient Position: Sitting   Cuff Size: Adult   Pulse: 71   SpO2: 96%   Weight: 97.5 kg (215 lb)   Height: 5' 10\" (1.778 m)         Current Medications       Current Outpatient Medications:     acetaminophen (TYLENOL) 325 mg tablet, Take 2 tablets (650 mg total) by mouth every 4 (four) hours as needed for mild pain, Disp: , Rfl:     Ascorbic Acid (VITAMIN C) 100 MG tablet, Take 100 mg by mouth daily, Disp: , Rfl:     diclofenac (VOLTAREN) 75 mg EC tablet, Take 1 tablet (75 mg total) by mouth 2 (two) times a day for 10 days, Disp: 20 tablet, Rfl: 1    esomeprazole (NexIUM) 20 mg capsule, Take 20 mg by mouth, Disp: , Rfl:     metFORMIN (GLUCOPHAGE) 500 mg tablet, Take 500 mg by mouth daily with breakfast, Disp: , Rfl:     Multiple Vitamin (MULTIVITAMIN) tablet, Take 1 tablet by mouth daily, Disp: , Rfl:     tamsulosin (FLOMAX) 0.4 mg, Take 1 capsule (0.4 mg total) by mouth daily with dinner, Disp: 30 capsule, Rfl: 6    albuterol (PROVENTIL HFA,VENTOLIN HFA) 90 mcg/act inhaler, Inhale 2 puffs every 6 (six) hours as needed for wheezing or shortness of breath (Patient not taking: Reported on 1/24/2025), Disp: 1 Inhaler, Rfl: 0    aspirin (ECOTRIN LOW " STRENGTH) 81 mg EC tablet, Take 162 mg by mouth daily (Patient not taking: Reported on 1/24/2025), Disp: , Rfl:     busPIRone (BUSPAR) 10 mg tablet, Take 10 mg by mouth 2 (two) times a day as needed (Patient not taking: Reported on 1/24/2025), Disp: , Rfl:     clobetasol (TEMOVATE) 0.05 % external solution, , Disp: , Rfl:     docusate sodium (COLACE) 100 mg capsule, Take 1 capsule (100 mg total) by mouth 2 (two) times a day for 15 days (Patient not taking: Reported on 1/24/2025), Disp: 30 capsule, Rfl: 0    fluticasone (Arnuity Ellipta) 200 MCG/ACT AEPB inhaler, Inhale 1 puff if needed Rinse mouth after use. (Patient not taking: Reported on 1/24/2025), Disp: , Rfl:     montelukast (SINGULAIR) 10 mg tablet, Take 1 tablet (10 mg total) by mouth daily at bedtime (Patient not taking: Reported on 1/24/2025), Disp: 30 tablet, Rfl: 2    naproxen (NAPROSYN) 500 mg tablet, Take 1 tablet (500 mg total) by mouth 2 (two) times a day with meals for 8 days For pain (Patient not taking: Reported on 1/24/2025), Disp: 15 tablet, Rfl: 0      Active Problems     Patient Active Problem List   Diagnosis    Left knee pain    Tear of medial meniscus of left knee, current    Status post arthroscopic partial medial meniscectomy    Arthritis of left knee    Trauma to left eye    Chest wall pain    Hyperlipidemia    Abnormal EKG    SIRS (systemic inflammatory response syndrome) (HCC)    Systolic murmur    Allergies    Spermatocele    Benign prostatic hyperplasia with urinary frequency         Past Medical History     Past Medical History:   Diagnosis Date    Allergic     Asthma     Left knee pain     Prediabetes     Seasonal allergies     Sleep apnea     doesnt wear cpap    Wears glasses          Surgical History     Past Surgical History:   Procedure Laterality Date    COLONOSCOPY      [place] ARTHRT W/EXC SEMILUNAR CRTLG KNEE MEDIAL/LAT Left 1/14/2020    Procedure: KNEE ARTHROSCOPIC MENISCECTOMY MEDIAL; PARTIAL SYNOVECTOMY;  Surgeon: Agustin  "DO Darnell;  [facility];  Service: Orthopedics    VA EXCISION HYDROCELE UNILATERAL Left 1/17/2025    Procedure: SPERMATOCELECTOMY;  Surgeon: Juno Lyon MD;  [facility];  Service: Urology         Family History     Family History   Problem Relation Age of Onset    No Known Problems Mother          Social History     Social History     Social History     Tobacco Use   Smoking Status Never   Smokeless Tobacco Never         Pertinent Lab Values     Lab Results   Component Value Date    CREATININE 0.81 07/20/2024       No results found for: \"PSA\"    @RESULTRCNT(1H])@      Pertinent Imaging       No results found.      Portions of the record may have been created with voice recognition software.  Occasional wrong word or \"sound a like\" substitutions may have occurred due to the inherent limitations of voice recognition software.  In addition some of the content generated from this outpatient encounter includes information designed for patient education and/or communication back to the referring provider.  Read the chart carefully and recognize, using context, where substitutions have occurred.    "

## 2025-02-20 ENCOUNTER — OFFICE VISIT (OUTPATIENT)
Dept: OBGYN CLINIC | Facility: CLINIC | Age: 60
End: 2025-02-20
Payer: COMMERCIAL

## 2025-02-20 ENCOUNTER — APPOINTMENT (OUTPATIENT)
Dept: RADIOLOGY | Facility: AMBULARY SURGERY CENTER | Age: 60
End: 2025-02-20
Attending: ORTHOPAEDIC SURGERY
Payer: COMMERCIAL

## 2025-02-20 VITALS — WEIGHT: 215 LBS | BODY MASS INDEX: 30.78 KG/M2 | HEIGHT: 70 IN

## 2025-02-20 DIAGNOSIS — M17.0 BILATERAL PRIMARY OSTEOARTHRITIS OF KNEE: Primary | ICD-10-CM

## 2025-02-20 DIAGNOSIS — Z98.890 HISTORY OF ARTHROSCOPY OF BOTH KNEES: ICD-10-CM

## 2025-02-20 DIAGNOSIS — M25.561 RIGHT KNEE PAIN, UNSPECIFIED CHRONICITY: ICD-10-CM

## 2025-02-20 DIAGNOSIS — M25.562 LEFT KNEE PAIN, UNSPECIFIED CHRONICITY: ICD-10-CM

## 2025-02-20 PROCEDURE — 73562 X-RAY EXAM OF KNEE 3: CPT

## 2025-02-20 PROCEDURE — 99203 OFFICE O/P NEW LOW 30 MIN: CPT | Performed by: ORTHOPAEDIC SURGERY

## 2025-02-20 NOTE — PROGRESS NOTES
Name: Agustin Renner      : 1965      MRN: [id number]  Encounter Provider: Agustin Patrick DO  Encounter Date: 2025   Encounter department: [facility]  :  Assessment & Plan  Bilateral primary osteoarthritis of knee    Orders:    XR knee 3 vw left non injury; Future    Medial  Knee Brace    Injection Procedure Prior Authorization; Future    History of arthroscopy of both knees    Orders:    XR knee 3 vw right non injury; Future    Medial  Knee Brace    Injection Procedure Prior Authorization; Future            Plan:  Agustin Renner is a 59 y.o. male with bilateral knee osteoarthritis, history of bilateral knee arthroscopies  Due to patients success with previous visco supplementation, and progression of underlying disease. Re-initiating visco supplementation is warranted. Patient did not have symptomatic relief from previous CSI.   A medial  brace was ordered, our brace fitter will follow up with him next week.   We will see him back in clinic once visco is apporived.     Subjective:    Patient ID:  Agustin Renner 59 y.o. male    History of Present Illness       Agustin Renner is a 59 y.o. male who presents today for follow up evaluation of bilateral knees. Patient has history of left knee arthroscopy with partial medial meniscectomy and partial synovectomy. 2020. And right knee arthroscopy with Dr Valdez prior to that. Patient has previously tried corticosteroid injections with no real benefit. However he does note significant long term benefit from visco supplementation. He notes a return of his activity related knee pain, his primary concern is stiffness. He is hoping to re-initiate visco at this time.           Lab Results   Component Value Date    HGBA1C 6.0 (H) 2024       The following portions of the patient's history were reviewed and updated as appropriate: allergies, current medications, past family history, past  social history, past surgical history and problem list.      Social History     Socioeconomic History    Marital status: /Civil Union     Spouse name: Not on file    Number of children: Not on file    Years of education: Not on file    Highest education level: Not on file   Occupational History    Not on file   Tobacco Use    Smoking status: Never    Smokeless tobacco: Never   Vaping Use    Vaping status: Never Used   Substance and Sexual Activity    Alcohol use: No    Drug use: No    Sexual activity: Yes   Other Topics Concern    Not on file   Social History Narrative    Not on file     Social Drivers of Health     Financial Resource Strain: Not on file   Food Insecurity: Not on file   Transportation Needs: Not on file   Physical Activity: Not on file   Stress: Not on file   Social Connections: Not on file   Intimate Partner Violence: Not on file   Housing Stability: Not on file     Past Medical History:   Diagnosis Date    Allergic     Asthma     Left knee pain     Prediabetes     Seasonal allergies     Sleep apnea     doesnt wear cpap    Wears glasses      Past Surgical History:   Procedure Laterality Date    COLONOSCOPY      [place] ARTHRT W/EXC SEMILUNAR CRTLG KNEE MEDIAL/LAT Left 1/14/2020    Procedure: KNEE ARTHROSCOPIC MENISCECTOMY MEDIAL; PARTIAL SYNOVECTOMY;  Surgeon: Agustin Patrick DO;  [facility];  Service: Orthopedics    VT EXCISION HYDROCELE UNILATERAL Left 1/17/2025    Procedure: SPERMATOCELECTOMY;  Surgeon: Juno Lyon MD;  [facility];  Service: Urology     Allergies   Allergen Reactions    Cat Dander Sneezing, Itching and Other (See Comments)     Other reaction(s): Sneezing    Acer Negundo Allergy Skin Test Other (See Comments)     Current Outpatient Medications on File Prior to Visit   Medication Sig Dispense Refill    acetaminophen (TYLENOL) 325 mg tablet Take 2 tablets (650 mg total) by mouth every 4 (four) hours as needed for mild pain      albuterol (PROVENTIL  "HFA,VENTOLIN HFA) 90 mcg/act inhaler Inhale 2 puffs every 6 (six) hours as needed for wheezing or shortness of breath (Patient not taking: Reported on 1/24/2025) 1 Inhaler 0    Ascorbic Acid (VITAMIN C) 100 MG tablet Take 100 mg by mouth daily      aspirin (ECOTRIN LOW STRENGTH) 81 mg EC tablet Take 162 mg by mouth daily (Patient not taking: Reported on 1/24/2025)      busPIRone (BUSPAR) 10 mg tablet Take 10 mg by mouth 2 (two) times a day as needed (Patient not taking: Reported on 1/24/2025)      clobetasol (TEMOVATE) 0.05 % external solution  (Patient not taking: Reported on 1/24/2025)      diclofenac (VOLTAREN) 75 mg EC tablet Take 1 tablet (75 mg total) by mouth 2 (two) times a day for 10 days 20 tablet 1    docusate sodium (COLACE) 100 mg capsule Take 1 capsule (100 mg total) by mouth 2 (two) times a day for 15 days (Patient not taking: Reported on 1/24/2025) 30 capsule 0    esomeprazole (NexIUM) 20 mg capsule Take 20 mg by mouth      fluticasone (Arnuity Ellipta) 200 MCG/ACT AEPB inhaler Inhale 1 puff if needed Rinse mouth after use. (Patient not taking: Reported on 1/24/2025)      metFORMIN (GLUCOPHAGE) 500 mg tablet Take 500 mg by mouth daily with breakfast      montelukast (SINGULAIR) 10 mg tablet Take 1 tablet (10 mg total) by mouth daily at bedtime (Patient not taking: Reported on 1/24/2025) 30 tablet 2    Multiple Vitamin (MULTIVITAMIN) tablet Take 1 tablet by mouth daily      naproxen (NAPROSYN) 500 mg tablet Take 1 tablet (500 mg total) by mouth 2 (two) times a day with meals for 8 days For pain (Patient not taking: Reported on 1/24/2025) 15 tablet 0    tamsulosin (FLOMAX) 0.4 mg Take 1 capsule (0.4 mg total) by mouth daily with dinner 30 capsule 6     No current facility-administered medications on file prior to visit.            Objective:  Objective   Ht 5' 10\" (1.778 m)   Wt 97.5 kg (215 lb)   BMI 30.85 kg/m²        Review of Systems  Pertinent items are noted in HPI.  All other systems were " "reviewed and are negative.    Physical Exam  Cons: Appears well.  No apparent distress.  Psych: Alert. Oriented x3.  Mood and affect normal.  Eyes: PERRLA, EOMI  Resp: Normal effort.  No audible wheezing or stridor.  CV: Palpable pulse.  No discernable arrhythmia.    Lymph:  No palpable cervical, axillary, or inguinal lymphadenopathy.  Skin: Warm.  No palpable masses.  No visible lesions.  Neuro: Normal muscle tone.  Normal and symmetric DTR's.    BMI:   Estimated body mass index is 30.85 kg/m² as calculated from the following:    Height as of this encounter: 5' 10\" (1.778 m).    Weight as of this encounter: 97.5 kg (215 lb).    Right Knee Exam     Tenderness   The patient is experiencing tenderness in the medial joint line.    Range of Motion   Right knee extension: 2.   Flexion:  100     Tests   Varus: negative Valgus: negative    Other   Erythema: absent  Sensation: normal  Pulse: present  Swelling: none  Effusion: no effusion present    Comments:  Varus laxity   Skin is warm and dry to touch with no signs of erythema, ecchymosis, or infection   Flexor and extensor mechanisms are intact   Knee is stable to varus and valgus stress  Patella tracks centrally with palpable crepitus  Calf compartments are soft and supple  2+ DP and PT pulses with brisk capillary refill to the toes  Sural, saphenous, tibial, superficial, and deep peroneal motor and sensory distributions intact  Sensation light touch intact distally        Left Knee Exam     Tenderness   The patient is experiencing tenderness in the medial joint line.    Range of Motion   Extension:  0   Flexion:  100     Tests   Varus: negative Valgus: negative    Other   Erythema: absent  Sensation: normal  Pulse: present  Swelling: none  Effusion: no effusion present    Comments:  Varus laxity   Skin is warm and dry to touch with no signs of erythema, ecchymosis, or infection   Flexor and extensor mechanisms are intact   Knee is stable to varus and valgus " "stress  Patella tracks centrally with palpable crepitus  Calf compartments are soft and supple  2+ DP and PT pulses with brisk capillary refill to the toes  Sural, saphenous, tibial, superficial, and deep peroneal motor and sensory distributions intact  Sensation light touch intact distally            Procedures  Procedures  No Procedures performed today    Diagnostics, reviewed and taken today if performed as documented:  I have personally reviewed pertinent films in PACS and my interpretation is bilateral knee tricompartmental osteoarthritis with joint space loss, subchondral sclerosis, and osteophytosis. Progression of disease compared to previous radiographs.        Scribe Attestation      I,:   am acting as a scribe while in the presence of the attending physician.:       I,:   personally performed the services described in this documentation    as scribed in my presence.:             Portions of the record may have been created with voice recognition software.  Occasional wrong word or \"sound a like\" substitutions may have occurred due to the inherent limitations of voice recognition software.  Read the chart carefully and recognize, using context, where substitutions have occurred.    "

## 2025-02-20 NOTE — ASSESSMENT & PLAN NOTE
Orders:    XR knee 3 vw left non injury; Future    Medial  Knee Brace    Injection Procedure Prior Authorization; Future

## 2025-02-20 NOTE — ASSESSMENT & PLAN NOTE
Orders:    XR knee 3 vw right non injury; Future    Medial  Knee Brace    Injection Procedure Prior Authorization; Future

## 2025-02-28 ENCOUNTER — TELEPHONE (OUTPATIENT)
Age: 60
End: 2025-02-28

## 2025-02-28 NOTE — TELEPHONE ENCOUNTER
Patient called to schedule Visco injection. Advised patient he can not schedule yet, we are still waiting for authorization.

## 2025-03-03 ENCOUNTER — TELEPHONE (OUTPATIENT)
Age: 60
End: 2025-03-03

## 2025-03-03 NOTE — TELEPHONE ENCOUNTER
Caller: Patient    Doctor: Dr. farias    Reason for call:     Patient calling back about his injections, spoke to office who will give him a call back to speak with him.      Call back#: n/a

## 2025-03-04 NOTE — TELEPHONE ENCOUNTER
Caller: Patient     Doctor: Dr. Patrick    Reason for call: Was expecting a call back regarding the status of his injections. Asking for a call back with update     Call back#: [phone]

## 2025-03-04 NOTE — TELEPHONE ENCOUNTER
I returned the patient phone call and provided him an updated on his VISCO knee injection. I explained to the patient we spoke with [facility] today and it has now been pushed up to Senior Management regarding his injections.   Patient stated he is getting several calls from [facility] and they are stating they have been trying to get a hold of our office since Friday. I stated we will continue to follow up with [facility] on his injections and once we receive the outcome we will contact him to provide the update. Patient requested a follow up once we hear regarding his VISCO.

## 2025-03-06 NOTE — TELEPHONE ENCOUNTER
Caller: Patient     Doctor: Dr. Patrick    Reason for call: Patient called the office very upset about his visco injections and would like a call back today. He stated someone from the office was going to call him back and he hasn't heard anything. Patient stated [facility] has been calling him and telling him they are unable to get a hold of our office.    Call back#: [phone]

## 2025-03-10 ENCOUNTER — OFFICE VISIT (OUTPATIENT)
Dept: UROLOGY | Facility: CLINIC | Age: 60
End: 2025-03-10

## 2025-03-10 VITALS
BODY MASS INDEX: 32.5 KG/M2 | HEART RATE: 60 BPM | HEIGHT: 70 IN | OXYGEN SATURATION: 97 % | DIASTOLIC BLOOD PRESSURE: 74 MMHG | WEIGHT: 227 LBS | SYSTOLIC BLOOD PRESSURE: 136 MMHG

## 2025-03-10 DIAGNOSIS — N43.40 SPERMATOCELE: Primary | ICD-10-CM

## 2025-03-10 PROCEDURE — 99024 POSTOP FOLLOW-UP VISIT: CPT | Performed by: PHYSICIAN ASSISTANT

## 2025-03-10 NOTE — PROGRESS NOTES
"Assessment:  #1.  Spermatocele ectomy  2.  Wound dehiscence      Plan:    -His wound has finally closed and is healed nicely except for a very small opening with no drainage  -He has some testicular/scrotal induration which is normal at this point in his recovery  -Will see him back in 3 months 1 final time if he still has any scrotal induration I may order an ultrasound    Wound care discussed.  Subjective     Agustin Renner presents to the clinic 3 months status post spermatocelectomy for post-op follow-up.  The patient reports . The patient is not having any pain..    Objective     /74 (BP Location: Left arm, Patient Position: Sitting, Cuff Size: Large)   Pulse 60   Ht 5' 10\" (1.778 m)   Wt 103 kg (227 lb)   SpO2 97%   BMI 32.57 kg/m²   General:  alert   Abdomen:    Incision:  Well with a small less than 1 cm opening without erythrema or artificial erection drainage           "

## 2025-03-11 ENCOUNTER — TELEPHONE (OUTPATIENT)
Age: 60
End: 2025-03-11

## 2025-03-11 NOTE — TELEPHONE ENCOUNTER
Caller: Patient    Doctor: Dr. Patrick    Reason for call: Patient calling stating that he spoke to someone in regard to his visco injections and per patient he was told by clinical staff that they can use the medication in office as long as his medication arrives by 3/10/25 and can replace the supply.  Per scheduling process appt cannot be scheduled until we receive notification by authorization team that medication is available and appt can be scheduled.  Patient became upset and disconnected call.     Call back#: [phone]

## 2025-03-11 NOTE — TELEPHONE ENCOUNTER
Caller: Patient    Doctor: Dr. Patrick    Reason for call: Asked to schedule his Gel injection and states he has an appointment but will have to change it. ( I did not see the injection has been delivered) Patient asked to speak with clinical team to change this.     Call back#: [phone]

## 2025-03-13 ENCOUNTER — PROCEDURE VISIT (OUTPATIENT)
Dept: OBGYN CLINIC | Facility: CLINIC | Age: 60
End: 2025-03-13
Payer: COMMERCIAL

## 2025-03-13 VITALS — WEIGHT: 227 LBS | BODY MASS INDEX: 32.5 KG/M2 | HEIGHT: 70 IN

## 2025-03-13 DIAGNOSIS — G89.29 BILATERAL CHRONIC KNEE PAIN: ICD-10-CM

## 2025-03-13 DIAGNOSIS — M17.0 BILATERAL PRIMARY OSTEOARTHRITIS OF KNEE: Primary | ICD-10-CM

## 2025-03-13 DIAGNOSIS — M25.562 BILATERAL CHRONIC KNEE PAIN: ICD-10-CM

## 2025-03-13 DIAGNOSIS — M25.561 BILATERAL CHRONIC KNEE PAIN: ICD-10-CM

## 2025-03-13 PROCEDURE — 20610 DRAIN/INJ JOINT/BURSA W/O US: CPT | Performed by: ORTHOPAEDIC SURGERY

## 2025-03-13 NOTE — PROGRESS NOTES
"Name: Agustin Renner      : 1965      MRN: [id number]  Encounter Provider: Agustin Patrick DO  Encounter Date: 3/13/2025   Encounter department: [facility]  :  Assessment & Plan  Bilateral primary osteoarthritis of knee  Bilateral knee Orthovisc #1/3 provided 3/13/2025       Bilateral chronic knee pain               Patient is here for his first injection of Orthovisc into the Bilateral knee.     Patient rates their pain as 0/10 today    Physical exam of the knee shows no effusion, no ecchymosis.      Large joint arthrocentesis: bilateral knee  Universal Protocol:  Consent: Verbal consent obtained.  Risks and benefits: risks, benefits and alternatives were discussed  Consent given by: patient  Time out: Immediately prior to procedure a \"time out\" was called to verify the correct patient, procedure, equipment, support staff and site/side marked as required.  Patient understanding: patient states understanding of the procedure being performed  Site marked: the operative site was marked  Supporting Documentation  Indications: pain   Procedure Details  Location: knee - bilateral knee  Preparation: Patient was prepped and draped in the usual sterile fashion  Needle size: 22 G    Medications (Right): 30 mg sodium hyaluronate 30 mg/2 mLSpecialty Pharmacy Supplied (Right): for right side  Medications (Left): 30 mg sodium hyaluronate 30 mg/2 mL   Specialty Pharmacy Supplied (Left): for left side  Patient tolerance: patient tolerated the procedure well with no immediate complications  Dressing:  Sterile dressing applied          Patient tolerated procedure follow up in one week for second injection  "

## 2025-03-20 ENCOUNTER — PROCEDURE VISIT (OUTPATIENT)
Dept: OBGYN CLINIC | Facility: CLINIC | Age: 60
End: 2025-03-20
Payer: COMMERCIAL

## 2025-03-20 VITALS — HEIGHT: 70 IN | BODY MASS INDEX: 32.5 KG/M2 | WEIGHT: 227 LBS

## 2025-03-20 DIAGNOSIS — M17.0 BILATERAL PRIMARY OSTEOARTHRITIS OF KNEE: Primary | ICD-10-CM

## 2025-03-20 PROCEDURE — 20610 DRAIN/INJ JOINT/BURSA W/O US: CPT | Performed by: PHYSICIAN ASSISTANT

## 2025-03-20 NOTE — PROGRESS NOTES
"Name: Agustin Renner      : 1965      MRN: [id number]  Encounter Provider: Agustin Patrick DO  Encounter Date: 3/20/2025   Encounter department: [facility]  :  Assessment & Plan  Bilateral primary osteoarthritis of knee               Patient is here for his second injection of Orthovisc into the Bilateral knee.     Patient rates their pain as 0/10 today    Physical exam of the knee shows no effusion, no ecchymosis.      Large joint arthrocentesis: bilateral knee  Universal Protocol:  Consent: Verbal consent obtained.  Risks and benefits: risks, benefits and alternatives were discussed  Consent given by: patient  Time out: Immediately prior to procedure a \"time out\" was called to verify the correct patient, procedure, equipment, support staff and site/side marked as required.  Patient understanding: patient states understanding of the procedure being performed  Site marked: the operative site was marked  Supporting Documentation  Indications: pain   Procedure Details  Location: knee - bilateral knee  Preparation: Patient was prepped and draped in the usual sterile fashion  Needle size: 22 G    Medications (Right): 30 mg sodium hyaluronate 30 mg/2 mLSpecialty Pharmacy Supplied (Right): for right side  Medications (Left): 30 mg sodium hyaluronate 30 mg/2 mL   Specialty Pharmacy Supplied (Left): for left side  Patient tolerance: patient tolerated the procedure well with no immediate complications  Dressing:  Sterile dressing applied          Patient tolerated procedure follow up for third injection.  "

## 2025-04-04 ENCOUNTER — PROCEDURE VISIT (OUTPATIENT)
Dept: OBGYN CLINIC | Facility: CLINIC | Age: 60
End: 2025-04-04
Payer: COMMERCIAL

## 2025-04-04 VITALS — WEIGHT: 227 LBS | BODY MASS INDEX: 32.5 KG/M2 | HEIGHT: 70 IN

## 2025-04-04 DIAGNOSIS — M17.0 BILATERAL PRIMARY OSTEOARTHRITIS OF KNEE: Primary | ICD-10-CM

## 2025-04-04 PROCEDURE — 20610 DRAIN/INJ JOINT/BURSA W/O US: CPT | Performed by: PHYSICIAN ASSISTANT

## 2025-04-04 NOTE — PROGRESS NOTES
Name: Agustin Renner      : 1965      MRN: [id number]  Encounter Provider: Agustin Patrick DO  Encounter Date: 2025   Encounter department: [facility]  :  Assessment & Plan  Bilateral primary osteoarthritis of knee               Patient is here for his 3rd  injection of Orthovisc into the Bilateral knee.     Patient rates their pain as 0/10 today    Physical exam of the knee shows no effusion, no ecchymosis.      Large joint arthrocentesis: bilateral knee  Universal Protocol:  Consent: Verbal consent obtained.  Risks and benefits: risks, benefits and alternatives were discussed  Consent given by: patient  Patient understanding: patient states understanding of the procedure being performed  Patient identity confirmed: verbally with patient  Supporting Documentation  Indications: pain   Procedure Details  Location: knee - bilateral knee  Preparation: Patient was prepped and draped in the usual sterile fashion  Needle size: 22 G  Approach: anterolateral    Medications (Right): 30 mg sodium hyaluronate 30 mg/2 mLMedications (Left): 30 mg sodium hyaluronate 30 mg/2 mL   Patient tolerance: patient tolerated the procedure well with no immediate complications  Dressing:  Sterile dressing applied          Patient tolerated procedure follow up prn

## (undated) DEVICE — DECANTER: Brand: UNBRANDED

## (undated) DEVICE — IMPERVIOUS STOCKINETTE: Brand: DEROYAL

## (undated) DEVICE — INTENDED FOR TISSUE SEPARATION, AND OTHER PROCEDURES THAT REQUIRE A SHARP SURGICAL BLADE TO PUNCTURE OR CUT.: Brand: BARD-PARKER SAFETY BLADES SIZE 15, STERILE

## (undated) DEVICE — CHLORAPREP HI-LITE 26ML ORANGE

## (undated) DEVICE — GLOVE INDICATOR PI UNDERGLOVE SZ 7 BLUE

## (undated) DEVICE — GAUZE SPONGES,16 PLY: Brand: CURITY

## (undated) DEVICE — ANTIBACTERIAL UNDYED BRAIDED (POLYGLACTIN 910), SYNTHETIC ABSORBABLE SUTURE: Brand: COATED VICRYL

## (undated) DEVICE — ROSEBUD DISSECTORS: Brand: DEROYAL

## (undated) DEVICE — SYRINGE 10ML LL

## (undated) DEVICE — TUBING ARTHROSCOPY REDUCE PATIENT

## (undated) DEVICE — NEEDLE 22 G X 1 1/2 SAFETY

## (undated) DEVICE — SUT VICRYL 2-0 SH 27 IN UNDYED J417H

## (undated) DEVICE — ABDOMINAL PAD: Brand: DERMACEA

## (undated) DEVICE — SUT ETHILON 3-0 PS-1 18 IN 1663G

## (undated) DEVICE — TUBING SUCTION 5MM X 12 FT

## (undated) DEVICE — BETHLEHEM UNIVERSAL MINOR GEN: Brand: CARDINAL HEALTH

## (undated) DEVICE — BLADE SHAVER DISSECTOR 3.5MM 13CM COOLCUT

## (undated) DEVICE — BETHLEHEM UNIVERSAL  ARTHRO PK: Brand: CARDINAL HEALTH

## (undated) DEVICE — GLOVE SRG BIOGEL ECLIPSE 8

## (undated) DEVICE — SPINAL NEEDLE TOUHY 14 G

## (undated) DEVICE — ACE WRAP 4 IN STERILE

## (undated) DEVICE — OCCLUSIVE GAUZE STRIP,3% BISMUTH TRIBROMOPHENATE IN PETROLATUM BLEND: Brand: XEROFORM

## (undated) DEVICE — VIAL DECANTER

## (undated) DEVICE — INTENDED FOR TISSUE SEPARATION, AND OTHER PROCEDURES THAT REQUIRE A SHARP SURGICAL BLADE TO PUNCTURE OR CUT.: Brand: BARD-PARKER ® CARBON RIB-BACK BLADES

## (undated) DEVICE — MEDI-VAC YANK SUCT HNDL W/TPRD BULBOUS TIP: Brand: CARDINAL HEALTH

## (undated) DEVICE — ACE WRAP 6 IN STERILE

## (undated) DEVICE — NEEDLE 25G X 1 1/2

## (undated) DEVICE — SUT VICRYL RAPIDE 4-0 PS-2 18IN VR496

## (undated) DEVICE — RAZOR STERILE

## (undated) DEVICE — VAPR COOLPULSE 90 ELECTRODE 90 DEGREES SUCTION WITH INTEGRATED HANDPIECE: Brand: VAPR COOLPULSE

## (undated) DEVICE — PADDING CAST 4 IN  COTTON STRL

## (undated) DEVICE — TUBING ARTHROSCOPY REDUCE PUMP

## (undated) DEVICE — SPECIMEN CONTAINER STERILE PEEL PACK

## (undated) DEVICE — GLOVE INDICATOR PI UNDERGLOVE SZ 8 BLUE

## (undated) DEVICE — PLUMEPEN PRO 10FT

## (undated) DEVICE — EXOFIN PRECISION PEN HIGH VISCOSITY TOPICAL SKIN ADHESIVE: Brand: EXOFIN PRECISION PEN, 1G